# Patient Record
Sex: MALE | Race: WHITE | NOT HISPANIC OR LATINO | Employment: OTHER | URBAN - METROPOLITAN AREA
[De-identification: names, ages, dates, MRNs, and addresses within clinical notes are randomized per-mention and may not be internally consistent; named-entity substitution may affect disease eponyms.]

---

## 2022-04-28 ENCOUNTER — HOSPITAL ENCOUNTER (OUTPATIENT)
Facility: HOSPITAL | Age: 82
Setting detail: OBSERVATION
Discharge: NURSING, REHAB OR LONG-TERM CARE | End: 2022-05-04
Attending: EMERGENCY MEDICINE | Admitting: HOSPITALIST

## 2022-04-28 ENCOUNTER — APPOINTMENT (OUTPATIENT)
Dept: GENERAL RADIOLOGY | Facility: HOSPITAL | Age: 82
End: 2022-04-28

## 2022-04-28 DIAGNOSIS — R77.8 ELEVATED TROPONIN: ICD-10-CM

## 2022-04-28 DIAGNOSIS — R07.89 CHEST PAIN, ATYPICAL: ICD-10-CM

## 2022-04-28 DIAGNOSIS — I49.3 FREQUENT PVCS: ICD-10-CM

## 2022-04-28 DIAGNOSIS — E83.42 HYPOMAGNESEMIA: ICD-10-CM

## 2022-04-28 DIAGNOSIS — I49.3 PVC (PREMATURE VENTRICULAR CONTRACTION): ICD-10-CM

## 2022-04-28 DIAGNOSIS — R55 NEAR SYNCOPE: Primary | ICD-10-CM

## 2022-04-28 PROBLEM — E78.5 HYPERLIPIDEMIA: Status: ACTIVE | Noted: 2021-01-25

## 2022-04-28 PROBLEM — IMO0002 STENOSIS OF URINARY MEATUS: Status: ACTIVE | Noted: 2019-02-14

## 2022-04-28 PROBLEM — E66.3 OVERWEIGHT WITH BODY MASS INDEX (BMI) 25.0-29.9: Status: ACTIVE | Noted: 2018-02-19

## 2022-04-28 LAB
ALBUMIN SERPL-MCNC: 3.6 G/DL (ref 3.5–5.2)
ALBUMIN/GLOB SERPL: 1.6 G/DL
ALP SERPL-CCNC: 39 U/L (ref 39–117)
ALT SERPL W P-5'-P-CCNC: 22 U/L (ref 1–41)
ANION GAP SERPL CALCULATED.3IONS-SCNC: 12.1 MMOL/L (ref 5–15)
AST SERPL-CCNC: 17 U/L (ref 1–40)
BASOPHILS # BLD AUTO: 0.04 10*3/MM3 (ref 0–0.2)
BASOPHILS NFR BLD AUTO: 0.4 % (ref 0–1.5)
BILIRUB SERPL-MCNC: 0.3 MG/DL (ref 0–1.2)
BUN SERPL-MCNC: 21 MG/DL (ref 8–23)
BUN/CREAT SERPL: 20.4 (ref 7–25)
CALCIUM SPEC-SCNC: 8.8 MG/DL (ref 8.6–10.5)
CHLORIDE SERPL-SCNC: 109 MMOL/L (ref 98–107)
CO2 SERPL-SCNC: 20.9 MMOL/L (ref 22–29)
CREAT SERPL-MCNC: 1.03 MG/DL (ref 0.76–1.27)
DEPRECATED RDW RBC AUTO: 50.3 FL (ref 37–54)
EGFRCR SERPLBLD CKD-EPI 2021: 72.1 ML/MIN/1.73
EOSINOPHIL # BLD AUTO: 0 10*3/MM3 (ref 0–0.4)
EOSINOPHIL NFR BLD AUTO: 0 % (ref 0.3–6.2)
ERYTHROCYTE [DISTWIDTH] IN BLOOD BY AUTOMATED COUNT: 15.3 % (ref 12.3–15.4)
GLOBULIN UR ELPH-MCNC: 2.2 GM/DL
GLUCOSE BLDC GLUCOMTR-MCNC: 206 MG/DL (ref 70–130)
GLUCOSE SERPL-MCNC: 145 MG/DL (ref 65–99)
HCT VFR BLD AUTO: 33.1 % (ref 37.5–51)
HGB BLD-MCNC: 10.6 G/DL (ref 13–17.7)
IMM GRANULOCYTES # BLD AUTO: 0.41 10*3/MM3 (ref 0–0.05)
IMM GRANULOCYTES NFR BLD AUTO: 3.9 % (ref 0–0.5)
LYMPHOCYTES # BLD AUTO: 2.45 10*3/MM3 (ref 0.7–3.1)
LYMPHOCYTES NFR BLD AUTO: 23.4 % (ref 19.6–45.3)
MAGNESIUM SERPL-MCNC: 1.4 MG/DL (ref 1.6–2.4)
MCH RBC QN AUTO: 28.5 PG (ref 26.6–33)
MCHC RBC AUTO-ENTMCNC: 32 G/DL (ref 31.5–35.7)
MCV RBC AUTO: 89 FL (ref 79–97)
MONOCYTES # BLD AUTO: 0.82 10*3/MM3 (ref 0.1–0.9)
MONOCYTES NFR BLD AUTO: 7.8 % (ref 5–12)
NEUTROPHILS NFR BLD AUTO: 6.74 10*3/MM3 (ref 1.7–7)
NEUTROPHILS NFR BLD AUTO: 64.5 % (ref 42.7–76)
NRBC BLD AUTO-RTO: 0 /100 WBC (ref 0–0.2)
NT-PROBNP SERPL-MCNC: 1468 PG/ML (ref 0–1800)
PLATELET # BLD AUTO: 156 10*3/MM3 (ref 140–450)
PMV BLD AUTO: 9.7 FL (ref 6–12)
POTASSIUM SERPL-SCNC: 4.2 MMOL/L (ref 3.5–5.2)
PROT SERPL-MCNC: 5.8 G/DL (ref 6–8.5)
QT INTERVAL: 402 MS
QT INTERVAL: 419 MS
RBC # BLD AUTO: 3.72 10*6/MM3 (ref 4.14–5.8)
SARS-COV-2 ORF1AB RESP QL NAA+PROBE: NOT DETECTED
SODIUM SERPL-SCNC: 142 MMOL/L (ref 136–145)
TROPONIN T SERPL-MCNC: 0.03 NG/ML (ref 0–0.03)
TROPONIN T SERPL-MCNC: 0.03 NG/ML (ref 0–0.03)
TSH SERPL DL<=0.05 MIU/L-ACNC: 2.22 UIU/ML (ref 0.27–4.2)
WBC NRBC COR # BLD: 10.46 10*3/MM3 (ref 3.4–10.8)

## 2022-04-28 PROCEDURE — 71045 X-RAY EXAM CHEST 1 VIEW: CPT

## 2022-04-28 PROCEDURE — G0378 HOSPITAL OBSERVATION PER HR: HCPCS

## 2022-04-28 PROCEDURE — 83880 ASSAY OF NATRIURETIC PEPTIDE: CPT | Performed by: EMERGENCY MEDICINE

## 2022-04-28 PROCEDURE — C9803 HOPD COVID-19 SPEC COLLECT: HCPCS

## 2022-04-28 PROCEDURE — 85025 COMPLETE CBC W/AUTO DIFF WBC: CPT | Performed by: EMERGENCY MEDICINE

## 2022-04-28 PROCEDURE — 83735 ASSAY OF MAGNESIUM: CPT | Performed by: EMERGENCY MEDICINE

## 2022-04-28 PROCEDURE — 71046 X-RAY EXAM CHEST 2 VIEWS: CPT

## 2022-04-28 PROCEDURE — 93005 ELECTROCARDIOGRAM TRACING: CPT | Performed by: INTERNAL MEDICINE

## 2022-04-28 PROCEDURE — U0004 COV-19 TEST NON-CDC HGH THRU: HCPCS | Performed by: EMERGENCY MEDICINE

## 2022-04-28 PROCEDURE — 93005 ELECTROCARDIOGRAM TRACING: CPT

## 2022-04-28 PROCEDURE — 84484 ASSAY OF TROPONIN QUANT: CPT | Performed by: INTERNAL MEDICINE

## 2022-04-28 PROCEDURE — 80053 COMPREHEN METABOLIC PANEL: CPT | Performed by: EMERGENCY MEDICINE

## 2022-04-28 PROCEDURE — 99204 OFFICE O/P NEW MOD 45 MIN: CPT | Performed by: INTERNAL MEDICINE

## 2022-04-28 PROCEDURE — 0 MAGNESIUM SULFATE 4 GM/100ML SOLUTION: Performed by: INTERNAL MEDICINE

## 2022-04-28 PROCEDURE — 25010000002 MAGNESIUM SULFATE 2 GM/50ML SOLUTION: Performed by: EMERGENCY MEDICINE

## 2022-04-28 PROCEDURE — 93010 ELECTROCARDIOGRAM REPORT: CPT | Performed by: INTERNAL MEDICINE

## 2022-04-28 PROCEDURE — 82962 GLUCOSE BLOOD TEST: CPT

## 2022-04-28 PROCEDURE — 84484 ASSAY OF TROPONIN QUANT: CPT | Performed by: EMERGENCY MEDICINE

## 2022-04-28 PROCEDURE — 99285 EMERGENCY DEPT VISIT HI MDM: CPT

## 2022-04-28 PROCEDURE — 84443 ASSAY THYROID STIM HORMONE: CPT | Performed by: INTERNAL MEDICINE

## 2022-04-28 RX ORDER — DOCUSATE SODIUM 100 MG/1
100 CAPSULE, LIQUID FILLED ORAL 2 TIMES DAILY PRN
COMMUNITY
End: 2022-05-25 | Stop reason: ALTCHOICE

## 2022-04-28 RX ORDER — UREA 10 %
1 LOTION (ML) TOPICAL NIGHTLY PRN
Status: DISCONTINUED | OUTPATIENT
Start: 2022-04-28 | End: 2022-05-04 | Stop reason: HOSPADM

## 2022-04-28 RX ORDER — AMOXICILLIN 250 MG
2 CAPSULE ORAL 2 TIMES DAILY
Status: DISCONTINUED | OUTPATIENT
Start: 2022-04-28 | End: 2022-05-04 | Stop reason: HOSPADM

## 2022-04-28 RX ORDER — MAGNESIUM SULFATE HEPTAHYDRATE 40 MG/ML
2 INJECTION, SOLUTION INTRAVENOUS AS NEEDED
Status: DISCONTINUED | OUTPATIENT
Start: 2022-04-28 | End: 2022-05-02

## 2022-04-28 RX ORDER — ECHINACEA PURPUREA EXTRACT 125 MG
1 TABLET ORAL AS NEEDED
Status: DISCONTINUED | OUTPATIENT
Start: 2022-04-28 | End: 2022-05-04 | Stop reason: HOSPADM

## 2022-04-28 RX ORDER — MECLIZINE HYDROCHLORIDE 25 MG/1
25 TABLET ORAL 4 TIMES DAILY PRN
COMMUNITY

## 2022-04-28 RX ORDER — ONDANSETRON 2 MG/ML
4 INJECTION INTRAMUSCULAR; INTRAVENOUS EVERY 6 HOURS PRN
Status: DISCONTINUED | OUTPATIENT
Start: 2022-04-28 | End: 2022-05-04 | Stop reason: HOSPADM

## 2022-04-28 RX ORDER — ASPIRIN 81 MG/1
81 TABLET, CHEWABLE ORAL DAILY
COMMUNITY

## 2022-04-28 RX ORDER — ECHINACEA PURPUREA EXTRACT 125 MG
1 TABLET ORAL AS NEEDED
COMMUNITY
End: 2022-05-25 | Stop reason: ALTCHOICE

## 2022-04-28 RX ORDER — SIMVASTATIN 40 MG
40 TABLET ORAL DAILY
COMMUNITY

## 2022-04-28 RX ORDER — ACETAMINOPHEN 325 MG/1
650 TABLET ORAL EVERY 4 HOURS PRN
Status: DISCONTINUED | OUTPATIENT
Start: 2022-04-28 | End: 2022-05-04 | Stop reason: HOSPADM

## 2022-04-28 RX ORDER — BISACODYL 5 MG/1
5 TABLET, DELAYED RELEASE ORAL DAILY PRN
Status: DISCONTINUED | OUTPATIENT
Start: 2022-04-28 | End: 2022-05-04 | Stop reason: HOSPADM

## 2022-04-28 RX ORDER — SODIUM CHLORIDE 0.9 % (FLUSH) 0.9 %
10 SYRINGE (ML) INJECTION AS NEEDED
Status: DISCONTINUED | OUTPATIENT
Start: 2022-04-28 | End: 2022-05-04 | Stop reason: HOSPADM

## 2022-04-28 RX ORDER — IBUPROFEN 200 MG
1 CAPSULE ORAL DAILY
COMMUNITY
End: 2022-05-25 | Stop reason: ALTCHOICE

## 2022-04-28 RX ORDER — MIDODRINE HYDROCHLORIDE 5 MG/1
5 TABLET ORAL
Status: DISCONTINUED | OUTPATIENT
Start: 2022-04-28 | End: 2022-04-30

## 2022-04-28 RX ORDER — ALBUTEROL SULFATE 2.5 MG/.5ML
2.5 SOLUTION RESPIRATORY (INHALATION) EVERY 6 HOURS PRN
Status: DISCONTINUED | OUTPATIENT
Start: 2022-04-28 | End: 2022-05-04 | Stop reason: HOSPADM

## 2022-04-28 RX ORDER — ACETAMINOPHEN 650 MG/1
650 SUPPOSITORY RECTAL EVERY 4 HOURS PRN
Status: DISCONTINUED | OUTPATIENT
Start: 2022-04-28 | End: 2022-05-02

## 2022-04-28 RX ORDER — SODIUM CHLORIDE 0.9 % (FLUSH) 0.9 %
10 SYRINGE (ML) INJECTION EVERY 12 HOURS SCHEDULED
Status: DISCONTINUED | OUTPATIENT
Start: 2022-04-28 | End: 2022-05-04 | Stop reason: HOSPADM

## 2022-04-28 RX ORDER — ASPIRIN 81 MG/1
81 TABLET, CHEWABLE ORAL DAILY
Status: DISCONTINUED | OUTPATIENT
Start: 2022-04-28 | End: 2022-05-04 | Stop reason: HOSPADM

## 2022-04-28 RX ORDER — TAMSULOSIN HYDROCHLORIDE 0.4 MG/1
0.4 CAPSULE ORAL NIGHTLY
Status: DISCONTINUED | OUTPATIENT
Start: 2022-04-28 | End: 2022-05-04 | Stop reason: HOSPADM

## 2022-04-28 RX ORDER — SODIUM PHOSPHATE, DIBASIC AND SODIUM PHOSPHATE, MONOBASIC 7; 19 G/133ML; G/133ML
1 ENEMA RECTAL ONCE AS NEEDED
COMMUNITY
End: 2022-05-25 | Stop reason: ALTCHOICE

## 2022-04-28 RX ORDER — POLYETHYLENE GLYCOL 3350 17 G/17G
17 POWDER, FOR SOLUTION ORAL DAILY PRN
Status: DISCONTINUED | OUTPATIENT
Start: 2022-04-28 | End: 2022-05-04 | Stop reason: HOSPADM

## 2022-04-28 RX ORDER — LISINOPRIL 2.5 MG/1
2.5 TABLET ORAL DAILY
Status: DISCONTINUED | OUTPATIENT
Start: 2022-04-28 | End: 2022-04-29

## 2022-04-28 RX ORDER — DEXTROSE MONOHYDRATE 25 G/50ML
25 INJECTION, SOLUTION INTRAVENOUS
Status: DISCONTINUED | OUTPATIENT
Start: 2022-04-28 | End: 2022-05-04 | Stop reason: HOSPADM

## 2022-04-28 RX ORDER — BISACODYL 10 MG
10 SUPPOSITORY, RECTAL RECTAL AS NEEDED
COMMUNITY
End: 2022-05-25 | Stop reason: ALTCHOICE

## 2022-04-28 RX ORDER — MAGNESIUM SULFATE HEPTAHYDRATE 40 MG/ML
4 INJECTION, SOLUTION INTRAVENOUS AS NEEDED
Status: DISCONTINUED | OUTPATIENT
Start: 2022-04-28 | End: 2022-05-02

## 2022-04-28 RX ORDER — NITROGLYCERIN 0.4 MG/1
0.4 TABLET SUBLINGUAL
Status: DISCONTINUED | OUTPATIENT
Start: 2022-04-28 | End: 2022-05-04 | Stop reason: HOSPADM

## 2022-04-28 RX ORDER — MEGESTROL ACETATE 40 MG/ML
400 SUSPENSION ORAL 2 TIMES DAILY
Status: DISCONTINUED | OUTPATIENT
Start: 2022-04-28 | End: 2022-05-04 | Stop reason: HOSPADM

## 2022-04-28 RX ORDER — TAMSULOSIN HYDROCHLORIDE 0.4 MG/1
1 CAPSULE ORAL DAILY
COMMUNITY

## 2022-04-28 RX ORDER — MEGESTROL ACETATE 40 MG/ML
400 SUSPENSION ORAL 2 TIMES DAILY
COMMUNITY

## 2022-04-28 RX ORDER — ONDANSETRON 4 MG/1
4 TABLET, FILM COATED ORAL EVERY 6 HOURS PRN
Status: DISCONTINUED | OUTPATIENT
Start: 2022-04-28 | End: 2022-05-04 | Stop reason: HOSPADM

## 2022-04-28 RX ORDER — BISACODYL 10 MG
10 SUPPOSITORY, RECTAL RECTAL DAILY PRN
Status: DISCONTINUED | OUTPATIENT
Start: 2022-04-28 | End: 2022-05-04 | Stop reason: HOSPADM

## 2022-04-28 RX ORDER — ALBUTEROL SULFATE 2.5 MG/.5ML
2.5 SOLUTION RESPIRATORY (INHALATION) EVERY 6 HOURS PRN
COMMUNITY

## 2022-04-28 RX ORDER — ACETAMINOPHEN 325 MG/1
650 TABLET ORAL EVERY 6 HOURS PRN
COMMUNITY

## 2022-04-28 RX ORDER — ATORVASTATIN CALCIUM 20 MG/1
20 TABLET, FILM COATED ORAL DAILY
Status: DISCONTINUED | OUTPATIENT
Start: 2022-04-28 | End: 2022-05-04 | Stop reason: HOSPADM

## 2022-04-28 RX ORDER — ACETAMINOPHEN 160 MG/5ML
650 SOLUTION ORAL EVERY 4 HOURS PRN
Status: DISCONTINUED | OUTPATIENT
Start: 2022-04-28 | End: 2022-05-02

## 2022-04-28 RX ORDER — CALCIUM CARBONATE 200(500)MG
2 TABLET,CHEWABLE ORAL 2 TIMES DAILY PRN
Status: DISCONTINUED | OUTPATIENT
Start: 2022-04-28 | End: 2022-05-02

## 2022-04-28 RX ORDER — INSULIN LISPRO 100 [IU]/ML
0-9 INJECTION, SOLUTION INTRAVENOUS; SUBCUTANEOUS
Status: DISCONTINUED | OUTPATIENT
Start: 2022-04-28 | End: 2022-05-04 | Stop reason: HOSPADM

## 2022-04-28 RX ORDER — IBUPROFEN 200 MG
1 CAPSULE ORAL DAILY
Status: DISCONTINUED | OUTPATIENT
Start: 2022-04-28 | End: 2022-05-04 | Stop reason: HOSPADM

## 2022-04-28 RX ORDER — INSULIN LISPRO 100 [IU]/ML
INJECTION, SOLUTION INTRAVENOUS; SUBCUTANEOUS
COMMUNITY

## 2022-04-28 RX ORDER — MIDODRINE HYDROCHLORIDE 5 MG/1
5 TABLET ORAL
COMMUNITY
End: 2022-05-04 | Stop reason: HOSPADM

## 2022-04-28 RX ORDER — NICOTINE POLACRILEX 4 MG
15 LOZENGE BUCCAL
Status: DISCONTINUED | OUTPATIENT
Start: 2022-04-28 | End: 2022-05-04 | Stop reason: HOSPADM

## 2022-04-28 RX ORDER — PANTOPRAZOLE SODIUM 40 MG/1
40 TABLET, DELAYED RELEASE ORAL DAILY
Status: DISCONTINUED | OUTPATIENT
Start: 2022-04-28 | End: 2022-05-04 | Stop reason: HOSPADM

## 2022-04-28 RX ORDER — DOCUSATE SODIUM 100 MG/1
100 CAPSULE, LIQUID FILLED ORAL 2 TIMES DAILY PRN
Status: DISCONTINUED | OUTPATIENT
Start: 2022-04-28 | End: 2022-05-04 | Stop reason: HOSPADM

## 2022-04-28 RX ORDER — PANTOPRAZOLE SODIUM 40 MG/1
40 TABLET, DELAYED RELEASE ORAL DAILY
COMMUNITY
End: 2022-05-25 | Stop reason: ALTCHOICE

## 2022-04-28 RX ADMIN — ASPIRIN 81 MG: 81 TABLET, CHEWABLE ORAL at 22:07

## 2022-04-28 RX ADMIN — ATORVASTATIN CALCIUM 20 MG: 20 TABLET, FILM COATED ORAL at 22:07

## 2022-04-28 RX ADMIN — LISINOPRIL 2.5 MG: 2.5 TABLET ORAL at 22:06

## 2022-04-28 RX ADMIN — TAMSULOSIN HYDROCHLORIDE 0.4 MG: 0.4 CAPSULE ORAL at 22:07

## 2022-04-28 RX ADMIN — Medication 10 ML: at 22:08

## 2022-04-28 RX ADMIN — MEGESTROL ACETATE 400 MG: 40 SUSPENSION ORAL at 22:07

## 2022-04-28 RX ADMIN — Medication 100 MG: at 22:07

## 2022-04-28 RX ADMIN — PANTOPRAZOLE SODIUM 40 MG: 40 TABLET, DELAYED RELEASE ORAL at 22:05

## 2022-04-28 RX ADMIN — Medication 1 APPLICATION: at 22:05

## 2022-04-28 RX ADMIN — MAGNESIUM SULFATE HEPTAHYDRATE 2 G: 2 INJECTION, SOLUTION INTRAVENOUS at 15:17

## 2022-04-28 RX ADMIN — MAGNESIUM SULFATE 4 G: 4 INJECTION INTRAVENOUS at 22:22

## 2022-04-29 LAB
ANION GAP SERPL CALCULATED.3IONS-SCNC: 11.3 MMOL/L (ref 5–15)
BASOPHILS # BLD AUTO: 0.02 10*3/MM3 (ref 0–0.2)
BASOPHILS NFR BLD AUTO: 0.2 % (ref 0–1.5)
BUN SERPL-MCNC: 17 MG/DL (ref 8–23)
BUN/CREAT SERPL: 18.9 (ref 7–25)
CALCIUM SPEC-SCNC: 8.5 MG/DL (ref 8.6–10.5)
CHLORIDE SERPL-SCNC: 111 MMOL/L (ref 98–107)
CO2 SERPL-SCNC: 18.7 MMOL/L (ref 22–29)
CREAT SERPL-MCNC: 0.9 MG/DL (ref 0.76–1.27)
DEPRECATED RDW RBC AUTO: 49.1 FL (ref 37–54)
EGFRCR SERPLBLD CKD-EPI 2021: 84.7 ML/MIN/1.73
EOSINOPHIL # BLD AUTO: 0 10*3/MM3 (ref 0–0.4)
EOSINOPHIL NFR BLD AUTO: 0 % (ref 0.3–6.2)
ERYTHROCYTE [DISTWIDTH] IN BLOOD BY AUTOMATED COUNT: 15.4 % (ref 12.3–15.4)
GLUCOSE BLDC GLUCOMTR-MCNC: 172 MG/DL (ref 70–130)
GLUCOSE BLDC GLUCOMTR-MCNC: 195 MG/DL (ref 70–130)
GLUCOSE BLDC GLUCOMTR-MCNC: 223 MG/DL (ref 70–130)
GLUCOSE BLDC GLUCOMTR-MCNC: 251 MG/DL (ref 70–130)
GLUCOSE SERPL-MCNC: 169 MG/DL (ref 65–99)
HCT VFR BLD AUTO: 31.8 % (ref 37.5–51)
HGB BLD-MCNC: 10.4 G/DL (ref 13–17.7)
IMM GRANULOCYTES # BLD AUTO: 0.31 10*3/MM3 (ref 0–0.05)
IMM GRANULOCYTES NFR BLD AUTO: 3.8 % (ref 0–0.5)
LYMPHOCYTES # BLD AUTO: 2.14 10*3/MM3 (ref 0.7–3.1)
LYMPHOCYTES NFR BLD AUTO: 25.9 % (ref 19.6–45.3)
MAGNESIUM SERPL-MCNC: 2.8 MG/DL (ref 1.6–2.4)
MCH RBC QN AUTO: 28.8 PG (ref 26.6–33)
MCHC RBC AUTO-ENTMCNC: 32.7 G/DL (ref 31.5–35.7)
MCV RBC AUTO: 88.1 FL (ref 79–97)
MONOCYTES # BLD AUTO: 0.7 10*3/MM3 (ref 0.1–0.9)
MONOCYTES NFR BLD AUTO: 8.5 % (ref 5–12)
NEUTROPHILS NFR BLD AUTO: 5.08 10*3/MM3 (ref 1.7–7)
NEUTROPHILS NFR BLD AUTO: 61.6 % (ref 42.7–76)
NRBC BLD AUTO-RTO: 0 /100 WBC (ref 0–0.2)
PHOSPHATE SERPL-MCNC: 3.6 MG/DL (ref 2.5–4.5)
PLATELET # BLD AUTO: 146 10*3/MM3 (ref 140–450)
PMV BLD AUTO: 9.4 FL (ref 6–12)
POTASSIUM SERPL-SCNC: 4.4 MMOL/L (ref 3.5–5.2)
QT INTERVAL: 439 MS
QT INTERVAL: 457 MS
RBC # BLD AUTO: 3.61 10*6/MM3 (ref 4.14–5.8)
SODIUM SERPL-SCNC: 141 MMOL/L (ref 136–145)
TROPONIN T SERPL-MCNC: 0.02 NG/ML (ref 0–0.03)
WBC NRBC COR # BLD: 8.25 10*3/MM3 (ref 3.4–10.8)

## 2022-04-29 PROCEDURE — 82962 GLUCOSE BLOOD TEST: CPT

## 2022-04-29 PROCEDURE — 93005 ELECTROCARDIOGRAM TRACING: CPT | Performed by: INTERNAL MEDICINE

## 2022-04-29 PROCEDURE — 80048 BASIC METABOLIC PNL TOTAL CA: CPT | Performed by: INTERNAL MEDICINE

## 2022-04-29 PROCEDURE — 93010 ELECTROCARDIOGRAM REPORT: CPT | Performed by: INTERNAL MEDICINE

## 2022-04-29 PROCEDURE — 97530 THERAPEUTIC ACTIVITIES: CPT

## 2022-04-29 PROCEDURE — 92523 SPEECH SOUND LANG COMPREHEN: CPT | Performed by: SPEECH-LANGUAGE PATHOLOGIST

## 2022-04-29 PROCEDURE — 94667 MNPJ CHEST WALL 1ST: CPT

## 2022-04-29 PROCEDURE — 96361 HYDRATE IV INFUSION ADD-ON: CPT

## 2022-04-29 PROCEDURE — G0378 HOSPITAL OBSERVATION PER HR: HCPCS

## 2022-04-29 PROCEDURE — 94761 N-INVAS EAR/PLS OXIMETRY MLT: CPT

## 2022-04-29 PROCEDURE — 97162 PT EVAL MOD COMPLEX 30 MIN: CPT

## 2022-04-29 PROCEDURE — 97166 OT EVAL MOD COMPLEX 45 MIN: CPT

## 2022-04-29 PROCEDURE — 99214 OFFICE O/P EST MOD 30 MIN: CPT | Performed by: NURSE PRACTITIONER

## 2022-04-29 PROCEDURE — 96360 HYDRATION IV INFUSION INIT: CPT

## 2022-04-29 PROCEDURE — 84484 ASSAY OF TROPONIN QUANT: CPT | Performed by: INTERNAL MEDICINE

## 2022-04-29 PROCEDURE — 94799 UNLISTED PULMONARY SVC/PX: CPT

## 2022-04-29 PROCEDURE — 84100 ASSAY OF PHOSPHORUS: CPT | Performed by: INTERNAL MEDICINE

## 2022-04-29 PROCEDURE — 94669 MECHANICAL CHEST WALL OSCILL: CPT

## 2022-04-29 PROCEDURE — 63710000001 INSULIN LISPRO (HUMAN) PER 5 UNITS: Performed by: INTERNAL MEDICINE

## 2022-04-29 PROCEDURE — 83735 ASSAY OF MAGNESIUM: CPT | Performed by: INTERNAL MEDICINE

## 2022-04-29 PROCEDURE — 85025 COMPLETE CBC W/AUTO DIFF WBC: CPT | Performed by: INTERNAL MEDICINE

## 2022-04-29 RX ORDER — IPRATROPIUM BROMIDE AND ALBUTEROL SULFATE 2.5; .5 MG/3ML; MG/3ML
3 SOLUTION RESPIRATORY (INHALATION)
Status: DISCONTINUED | OUTPATIENT
Start: 2022-04-29 | End: 2022-05-04 | Stop reason: HOSPADM

## 2022-04-29 RX ADMIN — LISINOPRIL 2.5 MG: 2.5 TABLET ORAL at 08:55

## 2022-04-29 RX ADMIN — Medication 1 APPLICATION: at 08:58

## 2022-04-29 RX ADMIN — DOCUSATE SODIUM 50MG AND SENNOSIDES 8.6MG 2 TABLET: 8.6; 5 TABLET, FILM COATED ORAL at 08:55

## 2022-04-29 RX ADMIN — MEGESTROL ACETATE 400 MG: 40 SUSPENSION ORAL at 08:55

## 2022-04-29 RX ADMIN — IPRATROPIUM BROMIDE AND ALBUTEROL SULFATE 3 ML: .5; 3 SOLUTION RESPIRATORY (INHALATION) at 11:20

## 2022-04-29 RX ADMIN — MIDODRINE HYDROCHLORIDE 5 MG: 5 TABLET ORAL at 10:56

## 2022-04-29 RX ADMIN — IPRATROPIUM BROMIDE AND ALBUTEROL SULFATE 3 ML: .5; 3 SOLUTION RESPIRATORY (INHALATION) at 21:47

## 2022-04-29 RX ADMIN — IPRATROPIUM BROMIDE AND ALBUTEROL SULFATE 3 ML: .5; 3 SOLUTION RESPIRATORY (INHALATION) at 08:19

## 2022-04-29 RX ADMIN — PANTOPRAZOLE SODIUM 40 MG: 40 TABLET, DELAYED RELEASE ORAL at 08:58

## 2022-04-29 RX ADMIN — INSULIN LISPRO 6 UNITS: 100 INJECTION, SOLUTION INTRAVENOUS; SUBCUTANEOUS at 10:57

## 2022-04-29 RX ADMIN — INSULIN LISPRO 2 UNITS: 100 INJECTION, SOLUTION INTRAVENOUS; SUBCUTANEOUS at 08:55

## 2022-04-29 RX ADMIN — MIDODRINE HYDROCHLORIDE 5 MG: 5 TABLET ORAL at 17:43

## 2022-04-29 RX ADMIN — ATORVASTATIN CALCIUM 20 MG: 20 TABLET, FILM COATED ORAL at 08:55

## 2022-04-29 RX ADMIN — TAMSULOSIN HYDROCHLORIDE 0.4 MG: 0.4 CAPSULE ORAL at 20:35

## 2022-04-29 RX ADMIN — Medication 10 ML: at 20:35

## 2022-04-29 RX ADMIN — Medication 10 ML: at 08:56

## 2022-04-29 RX ADMIN — MIDODRINE HYDROCHLORIDE 5 MG: 5 TABLET ORAL at 06:52

## 2022-04-29 RX ADMIN — SODIUM CHLORIDE 500 ML: 9 INJECTION, SOLUTION INTRAVENOUS at 14:47

## 2022-04-29 RX ADMIN — IPRATROPIUM BROMIDE AND ALBUTEROL SULFATE 3 ML: .5; 3 SOLUTION RESPIRATORY (INHALATION) at 15:49

## 2022-04-29 RX ADMIN — ASPIRIN 81 MG: 81 TABLET, CHEWABLE ORAL at 08:55

## 2022-04-29 RX ADMIN — MEGESTROL ACETATE 400 MG: 40 SUSPENSION ORAL at 20:35

## 2022-04-29 RX ADMIN — Medication 100 MG: at 08:55

## 2022-04-30 ENCOUNTER — APPOINTMENT (OUTPATIENT)
Dept: CARDIOLOGY | Facility: HOSPITAL | Age: 82
End: 2022-04-30

## 2022-04-30 LAB
ANION GAP SERPL CALCULATED.3IONS-SCNC: 13.5 MMOL/L (ref 5–15)
AORTIC DIMENSIONLESS INDEX: 1 (DI)
BASOPHILS # BLD AUTO: 0.02 10*3/MM3 (ref 0–0.2)
BASOPHILS NFR BLD AUTO: 0.2 % (ref 0–1.5)
BH CV ECHO MEAS - AO MAX PG: 6 MMHG
BH CV ECHO MEAS - AO MEAN PG: 2.9 MMHG
BH CV ECHO MEAS - AO V2 MAX: 122.2 CM/SEC
BH CV ECHO MEAS - AO V2 VTI: 18.7 CM
BH CV ECHO MEAS - AVA(I,D): 4.5 CM2
BH CV ECHO MEAS - EDV(CUBED): 89.5 ML
BH CV ECHO MEAS - EDV(MOD-SP2): 85 ML
BH CV ECHO MEAS - EDV(MOD-SP4): 120 ML
BH CV ECHO MEAS - EF(MOD-BP): 50.1 %
BH CV ECHO MEAS - EF(MOD-SP2): 47.1 %
BH CV ECHO MEAS - EF(MOD-SP4): 48.3 %
BH CV ECHO MEAS - ESV(CUBED): 31.5 ML
BH CV ECHO MEAS - ESV(MOD-SP2): 45 ML
BH CV ECHO MEAS - ESV(MOD-SP4): 62 ML
BH CV ECHO MEAS - FS: 29.4 %
BH CV ECHO MEAS - IVS/LVPW: 1.16 CM
BH CV ECHO MEAS - IVSD: 1.13 CM
BH CV ECHO MEAS - LAT PEAK E' VEL: 7.1 CM/SEC
BH CV ECHO MEAS - LV DIASTOLIC VOL/BSA (35-75): 57.2 CM2
BH CV ECHO MEAS - LV MASS(C)D: 162.5 GRAMS
BH CV ECHO MEAS - LV MAX PG: 3.3 MMHG
BH CV ECHO MEAS - LV MEAN PG: 1.75 MMHG
BH CV ECHO MEAS - LV SYSTOLIC VOL/BSA (12-30): 29.6 CM2
BH CV ECHO MEAS - LV V1 MAX: 91.3 CM/SEC
BH CV ECHO MEAS - LV V1 VTI: 18.2 CM
BH CV ECHO MEAS - LVIDD: 4.5 CM
BH CV ECHO MEAS - LVIDS: 3.2 CM
BH CV ECHO MEAS - LVOT AREA: 4.6 CM2
BH CV ECHO MEAS - LVOT DIAM: 2.41 CM
BH CV ECHO MEAS - LVPWD: 0.97 CM
BH CV ECHO MEAS - MED PEAK E' VEL: 6 CM/SEC
BH CV ECHO MEAS - MV A DUR: 0.15 SEC
BH CV ECHO MEAS - MV A MAX VEL: 102.8 CM/SEC
BH CV ECHO MEAS - MV DEC SLOPE: 468.8 CM/SEC2
BH CV ECHO MEAS - MV DEC TIME: 0.12 MSEC
BH CV ECHO MEAS - MV E MAX VEL: 62.4 CM/SEC
BH CV ECHO MEAS - MV E/A: 0.61
BH CV ECHO MEAS - MV MAX PG: 4.6 MMHG
BH CV ECHO MEAS - MV MEAN PG: 1.52 MMHG
BH CV ECHO MEAS - MV P1/2T: 36.9 MSEC
BH CV ECHO MEAS - MV V2 VTI: 20.4 CM
BH CV ECHO MEAS - MVA(P1/2T): 6 CM2
BH CV ECHO MEAS - MVA(VTI): 4.1 CM2
BH CV ECHO MEAS - PA ACC TIME: 0.09 SEC
BH CV ECHO MEAS - PA PR(ACCEL): 37.4 MMHG
BH CV ECHO MEAS - PA V2 MAX: 118.4 CM/SEC
BH CV ECHO MEAS - PULM A REVS DUR: 0.16 SEC
BH CV ECHO MEAS - PULM A REVS VEL: 43.3 CM/SEC
BH CV ECHO MEAS - PULM DIAS VEL: 40.9 CM/SEC
BH CV ECHO MEAS - PULM S/D: 2.01
BH CV ECHO MEAS - PULM SYS VEL: 82.4 CM/SEC
BH CV ECHO MEAS - QP/QS: 0.84
BH CV ECHO MEAS - RAP SYSTOLE: 3 MMHG
BH CV ECHO MEAS - RV MAX PG: 3 MMHG
BH CV ECHO MEAS - RV V1 MAX: 87 CM/SEC
BH CV ECHO MEAS - RV V1 VTI: 14.4 CM
BH CV ECHO MEAS - RVOT DIAM: 2.48 CM
BH CV ECHO MEAS - RVSP: 42.9 MMHG
BH CV ECHO MEAS - SI(MOD-SP2): 19.1 ML/M2
BH CV ECHO MEAS - SI(MOD-SP4): 27.7 ML/M2
BH CV ECHO MEAS - SV(LVOT): 83 ML
BH CV ECHO MEAS - SV(MOD-SP2): 40 ML
BH CV ECHO MEAS - SV(MOD-SP4): 58 ML
BH CV ECHO MEAS - SV(RVOT): 69.4 ML
BH CV ECHO MEAS - TAPSE (>1.6): 2.8 CM
BH CV ECHO MEAS - TR MAX PG: 39.9 MMHG
BH CV ECHO MEAS - TR MAX VEL: 315.8 CM/SEC
BH CV ECHO MEASUREMENTS AVERAGE E/E' RATIO: 9.53
BH CV VAS BP RIGHT ARM: NORMAL MMHG
BH CV XLRA - TDI S': 17.4 CM/SEC
BUN SERPL-MCNC: 21 MG/DL (ref 8–23)
BUN/CREAT SERPL: 18.9 (ref 7–25)
CALCIUM SPEC-SCNC: 8.3 MG/DL (ref 8.6–10.5)
CHLORIDE SERPL-SCNC: 109 MMOL/L (ref 98–107)
CO2 SERPL-SCNC: 17.5 MMOL/L (ref 22–29)
CREAT SERPL-MCNC: 1.11 MG/DL (ref 0.76–1.27)
DEPRECATED RDW RBC AUTO: 50.4 FL (ref 37–54)
EGFRCR SERPLBLD CKD-EPI 2021: 65.9 ML/MIN/1.73
EOSINOPHIL # BLD AUTO: 0 10*3/MM3 (ref 0–0.4)
EOSINOPHIL NFR BLD AUTO: 0 % (ref 0.3–6.2)
ERYTHROCYTE [DISTWIDTH] IN BLOOD BY AUTOMATED COUNT: 15.6 % (ref 12.3–15.4)
GLUCOSE BLDC GLUCOMTR-MCNC: 163 MG/DL (ref 70–130)
GLUCOSE BLDC GLUCOMTR-MCNC: 197 MG/DL (ref 70–130)
GLUCOSE BLDC GLUCOMTR-MCNC: 219 MG/DL (ref 70–130)
GLUCOSE BLDC GLUCOMTR-MCNC: 244 MG/DL (ref 70–130)
GLUCOSE SERPL-MCNC: 176 MG/DL (ref 65–99)
HCT VFR BLD AUTO: 29.7 % (ref 37.5–51)
HGB BLD-MCNC: 9.5 G/DL (ref 13–17.7)
IMM GRANULOCYTES # BLD AUTO: 0.24 10*3/MM3 (ref 0–0.05)
IMM GRANULOCYTES NFR BLD AUTO: 2.8 % (ref 0–0.5)
LEFT ATRIUM VOLUME INDEX: 21.2 ML/M2
LYMPHOCYTES # BLD AUTO: 1.6 10*3/MM3 (ref 0.7–3.1)
LYMPHOCYTES NFR BLD AUTO: 18.4 % (ref 19.6–45.3)
MAXIMAL PREDICTED HEART RATE: 137 BPM
MCH RBC QN AUTO: 28.7 PG (ref 26.6–33)
MCHC RBC AUTO-ENTMCNC: 32 G/DL (ref 31.5–35.7)
MCV RBC AUTO: 89.7 FL (ref 79–97)
MONOCYTES # BLD AUTO: 0.72 10*3/MM3 (ref 0.1–0.9)
MONOCYTES NFR BLD AUTO: 8.3 % (ref 5–12)
NEUTROPHILS NFR BLD AUTO: 6.13 10*3/MM3 (ref 1.7–7)
NEUTROPHILS NFR BLD AUTO: 70.3 % (ref 42.7–76)
NRBC BLD AUTO-RTO: 0.1 /100 WBC (ref 0–0.2)
PLATELET # BLD AUTO: 141 10*3/MM3 (ref 140–450)
PMV BLD AUTO: 9.3 FL (ref 6–12)
POTASSIUM SERPL-SCNC: 4.3 MMOL/L (ref 3.5–5.2)
RBC # BLD AUTO: 3.31 10*6/MM3 (ref 4.14–5.8)
SINUS: 3.9 CM
SODIUM SERPL-SCNC: 140 MMOL/L (ref 136–145)
STRESS TARGET HR: 116 BPM
WBC NRBC COR # BLD: 8.71 10*3/MM3 (ref 3.4–10.8)

## 2022-04-30 PROCEDURE — G0378 HOSPITAL OBSERVATION PER HR: HCPCS

## 2022-04-30 PROCEDURE — 97110 THERAPEUTIC EXERCISES: CPT

## 2022-04-30 PROCEDURE — 94799 UNLISTED PULMONARY SVC/PX: CPT

## 2022-04-30 PROCEDURE — 94664 DEMO&/EVAL PT USE INHALER: CPT

## 2022-04-30 PROCEDURE — 94761 N-INVAS EAR/PLS OXIMETRY MLT: CPT

## 2022-04-30 PROCEDURE — 63710000001 INSULIN LISPRO (HUMAN) PER 5 UNITS: Performed by: INTERNAL MEDICINE

## 2022-04-30 PROCEDURE — 93306 TTE W/DOPPLER COMPLETE: CPT | Performed by: INTERNAL MEDICINE

## 2022-04-30 PROCEDURE — 99214 OFFICE O/P EST MOD 30 MIN: CPT | Performed by: NURSE PRACTITIONER

## 2022-04-30 PROCEDURE — 82962 GLUCOSE BLOOD TEST: CPT

## 2022-04-30 PROCEDURE — 94668 MNPJ CHEST WALL SBSQ: CPT

## 2022-04-30 PROCEDURE — 96361 HYDRATE IV INFUSION ADD-ON: CPT

## 2022-04-30 PROCEDURE — 85025 COMPLETE CBC W/AUTO DIFF WBC: CPT | Performed by: INTERNAL MEDICINE

## 2022-04-30 PROCEDURE — 93306 TTE W/DOPPLER COMPLETE: CPT

## 2022-04-30 PROCEDURE — 80048 BASIC METABOLIC PNL TOTAL CA: CPT | Performed by: INTERNAL MEDICINE

## 2022-04-30 PROCEDURE — 94669 MECHANICAL CHEST WALL OSCILL: CPT

## 2022-04-30 RX ORDER — SODIUM BICARBONATE 650 MG/1
650 TABLET ORAL 2 TIMES DAILY
Status: DISCONTINUED | OUTPATIENT
Start: 2022-04-30 | End: 2022-05-04 | Stop reason: HOSPADM

## 2022-04-30 RX ADMIN — Medication 100 MG: at 09:40

## 2022-04-30 RX ADMIN — MEGESTROL ACETATE 400 MG: 40 SUSPENSION ORAL at 20:20

## 2022-04-30 RX ADMIN — INSULIN LISPRO 2 UNITS: 100 INJECTION, SOLUTION INTRAVENOUS; SUBCUTANEOUS at 12:14

## 2022-04-30 RX ADMIN — Medication 10 ML: at 20:20

## 2022-04-30 RX ADMIN — INSULIN LISPRO 4 UNITS: 100 INJECTION, SOLUTION INTRAVENOUS; SUBCUTANEOUS at 16:33

## 2022-04-30 RX ADMIN — IPRATROPIUM BROMIDE AND ALBUTEROL SULFATE 3 ML: .5; 3 SOLUTION RESPIRATORY (INHALATION) at 07:08

## 2022-04-30 RX ADMIN — SODIUM BICARBONATE 650 MG: 650 TABLET ORAL at 14:20

## 2022-04-30 RX ADMIN — IPRATROPIUM BROMIDE AND ALBUTEROL SULFATE 3 ML: .5; 3 SOLUTION RESPIRATORY (INHALATION) at 11:03

## 2022-04-30 RX ADMIN — ASPIRIN 81 MG: 81 TABLET, CHEWABLE ORAL at 09:38

## 2022-04-30 RX ADMIN — PANTOPRAZOLE SODIUM 40 MG: 40 TABLET, DELAYED RELEASE ORAL at 09:39

## 2022-04-30 RX ADMIN — MIDODRINE HYDROCHLORIDE 7.5 MG: 5 TABLET ORAL at 16:33

## 2022-04-30 RX ADMIN — DOCUSATE SODIUM 50MG AND SENNOSIDES 8.6MG 2 TABLET: 8.6; 5 TABLET, FILM COATED ORAL at 20:20

## 2022-04-30 RX ADMIN — ATORVASTATIN CALCIUM 20 MG: 20 TABLET, FILM COATED ORAL at 09:38

## 2022-04-30 RX ADMIN — Medication 1 APPLICATION: at 09:39

## 2022-04-30 RX ADMIN — TAMSULOSIN HYDROCHLORIDE 0.4 MG: 0.4 CAPSULE ORAL at 20:20

## 2022-04-30 RX ADMIN — INSULIN LISPRO 2 UNITS: 100 INJECTION, SOLUTION INTRAVENOUS; SUBCUTANEOUS at 09:38

## 2022-04-30 RX ADMIN — SODIUM BICARBONATE 650 MG: 650 TABLET ORAL at 20:20

## 2022-04-30 RX ADMIN — SODIUM CHLORIDE 500 ML: 9 INJECTION, SOLUTION INTRAVENOUS at 14:20

## 2022-04-30 RX ADMIN — IPRATROPIUM BROMIDE AND ALBUTEROL SULFATE 3 ML: .5; 3 SOLUTION RESPIRATORY (INHALATION) at 15:58

## 2022-04-30 RX ADMIN — MICONAZOLE NITRATE 1 APPLICATION: 20 CREAM TOPICAL at 09:00

## 2022-04-30 RX ADMIN — MEGESTROL ACETATE 400 MG: 40 SUSPENSION ORAL at 09:38

## 2022-04-30 RX ADMIN — DOCUSATE SODIUM 50MG AND SENNOSIDES 8.6MG 2 TABLET: 8.6; 5 TABLET, FILM COATED ORAL at 09:38

## 2022-04-30 RX ADMIN — MIDODRINE HYDROCHLORIDE 5 MG: 5 TABLET ORAL at 06:38

## 2022-04-30 RX ADMIN — MIDODRINE HYDROCHLORIDE 5 MG: 5 TABLET ORAL at 12:14

## 2022-04-30 RX ADMIN — MICONAZOLE NITRATE 1 APPLICATION: 20 CREAM TOPICAL at 20:20

## 2022-04-30 RX ADMIN — IPRATROPIUM BROMIDE AND ALBUTEROL SULFATE 3 ML: .5; 3 SOLUTION RESPIRATORY (INHALATION) at 20:11

## 2022-04-30 RX ADMIN — Medication 10 ML: at 09:00

## 2022-05-01 LAB
ANION GAP SERPL CALCULATED.3IONS-SCNC: 12 MMOL/L (ref 5–15)
BASOPHILS # BLD AUTO: 0.02 10*3/MM3 (ref 0–0.2)
BASOPHILS NFR BLD AUTO: 0.2 % (ref 0–1.5)
BUN SERPL-MCNC: 19 MG/DL (ref 8–23)
BUN/CREAT SERPL: 20.4 (ref 7–25)
CALCIUM SPEC-SCNC: 8.5 MG/DL (ref 8.6–10.5)
CHLORIDE SERPL-SCNC: 108 MMOL/L (ref 98–107)
CO2 SERPL-SCNC: 19 MMOL/L (ref 22–29)
CREAT SERPL-MCNC: 0.93 MG/DL (ref 0.76–1.27)
DEPRECATED RDW RBC AUTO: 50.7 FL (ref 37–54)
EGFRCR SERPLBLD CKD-EPI 2021: 81.5 ML/MIN/1.73
EOSINOPHIL # BLD AUTO: 0 10*3/MM3 (ref 0–0.4)
EOSINOPHIL NFR BLD AUTO: 0 % (ref 0.3–6.2)
ERYTHROCYTE [DISTWIDTH] IN BLOOD BY AUTOMATED COUNT: 15.6 % (ref 12.3–15.4)
GLUCOSE BLDC GLUCOMTR-MCNC: 137 MG/DL (ref 70–130)
GLUCOSE BLDC GLUCOMTR-MCNC: 192 MG/DL (ref 70–130)
GLUCOSE BLDC GLUCOMTR-MCNC: 197 MG/DL (ref 70–130)
GLUCOSE BLDC GLUCOMTR-MCNC: 218 MG/DL (ref 70–130)
GLUCOSE SERPL-MCNC: 157 MG/DL (ref 65–99)
HCT VFR BLD AUTO: 31 % (ref 37.5–51)
HGB BLD-MCNC: 10 G/DL (ref 13–17.7)
IMM GRANULOCYTES # BLD AUTO: 0.21 10*3/MM3 (ref 0–0.05)
IMM GRANULOCYTES NFR BLD AUTO: 2.5 % (ref 0–0.5)
LYMPHOCYTES # BLD AUTO: 1.77 10*3/MM3 (ref 0.7–3.1)
LYMPHOCYTES NFR BLD AUTO: 21.4 % (ref 19.6–45.3)
MCH RBC QN AUTO: 29 PG (ref 26.6–33)
MCHC RBC AUTO-ENTMCNC: 32.3 G/DL (ref 31.5–35.7)
MCV RBC AUTO: 89.9 FL (ref 79–97)
MONOCYTES # BLD AUTO: 0.74 10*3/MM3 (ref 0.1–0.9)
MONOCYTES NFR BLD AUTO: 8.9 % (ref 5–12)
NEUTROPHILS NFR BLD AUTO: 5.55 10*3/MM3 (ref 1.7–7)
NEUTROPHILS NFR BLD AUTO: 67 % (ref 42.7–76)
NRBC BLD AUTO-RTO: 0 /100 WBC (ref 0–0.2)
PLATELET # BLD AUTO: 131 10*3/MM3 (ref 140–450)
PMV BLD AUTO: 9.2 FL (ref 6–12)
POTASSIUM SERPL-SCNC: 4.6 MMOL/L (ref 3.5–5.2)
RBC # BLD AUTO: 3.45 10*6/MM3 (ref 4.14–5.8)
SODIUM SERPL-SCNC: 139 MMOL/L (ref 136–145)
WBC NRBC COR # BLD: 8.29 10*3/MM3 (ref 3.4–10.8)

## 2022-05-01 PROCEDURE — 96361 HYDRATE IV INFUSION ADD-ON: CPT

## 2022-05-01 PROCEDURE — 82962 GLUCOSE BLOOD TEST: CPT

## 2022-05-01 PROCEDURE — 80048 BASIC METABOLIC PNL TOTAL CA: CPT | Performed by: INTERNAL MEDICINE

## 2022-05-01 PROCEDURE — 63710000001 INSULIN LISPRO (HUMAN) PER 5 UNITS: Performed by: INTERNAL MEDICINE

## 2022-05-01 PROCEDURE — G0378 HOSPITAL OBSERVATION PER HR: HCPCS

## 2022-05-01 PROCEDURE — 94799 UNLISTED PULMONARY SVC/PX: CPT

## 2022-05-01 PROCEDURE — 85025 COMPLETE CBC W/AUTO DIFF WBC: CPT | Performed by: INTERNAL MEDICINE

## 2022-05-01 PROCEDURE — 94664 DEMO&/EVAL PT USE INHALER: CPT

## 2022-05-01 PROCEDURE — 94669 MECHANICAL CHEST WALL OSCILL: CPT

## 2022-05-01 PROCEDURE — 99214 OFFICE O/P EST MOD 30 MIN: CPT | Performed by: NURSE PRACTITIONER

## 2022-05-01 PROCEDURE — 94761 N-INVAS EAR/PLS OXIMETRY MLT: CPT

## 2022-05-01 RX ORDER — MIDODRINE HYDROCHLORIDE 10 MG/1
10 TABLET ORAL
Qty: 90 TABLET | Refills: 0 | Status: SHIPPED | OUTPATIENT
Start: 2022-05-01 | End: 2022-05-25

## 2022-05-01 RX ORDER — MIDODRINE HYDROCHLORIDE 5 MG/1
10 TABLET ORAL
Status: DISCONTINUED | OUTPATIENT
Start: 2022-05-01 | End: 2022-05-04 | Stop reason: HOSPADM

## 2022-05-01 RX ADMIN — MIDODRINE HYDROCHLORIDE 10 MG: 5 TABLET ORAL at 11:57

## 2022-05-01 RX ADMIN — DOCUSATE SODIUM 50MG AND SENNOSIDES 8.6MG 2 TABLET: 8.6; 5 TABLET, FILM COATED ORAL at 08:15

## 2022-05-01 RX ADMIN — MICONAZOLE NITRATE 1 APPLICATION: 20 CREAM TOPICAL at 09:11

## 2022-05-01 RX ADMIN — MIDODRINE HYDROCHLORIDE 10 MG: 5 TABLET ORAL at 18:04

## 2022-05-01 RX ADMIN — MIDODRINE HYDROCHLORIDE 7.5 MG: 5 TABLET ORAL at 06:06

## 2022-05-01 RX ADMIN — PANTOPRAZOLE SODIUM 40 MG: 40 TABLET, DELAYED RELEASE ORAL at 08:15

## 2022-05-01 RX ADMIN — Medication 10 ML: at 23:47

## 2022-05-01 RX ADMIN — SODIUM BICARBONATE 650 MG: 650 TABLET ORAL at 08:15

## 2022-05-01 RX ADMIN — ATORVASTATIN CALCIUM 20 MG: 20 TABLET, FILM COATED ORAL at 08:15

## 2022-05-01 RX ADMIN — IPRATROPIUM BROMIDE AND ALBUTEROL SULFATE 3 ML: .5; 3 SOLUTION RESPIRATORY (INHALATION) at 07:31

## 2022-05-01 RX ADMIN — MEGESTROL ACETATE 400 MG: 40 SUSPENSION ORAL at 23:45

## 2022-05-01 RX ADMIN — IPRATROPIUM BROMIDE AND ALBUTEROL SULFATE 3 ML: .5; 3 SOLUTION RESPIRATORY (INHALATION) at 15:27

## 2022-05-01 RX ADMIN — MEGESTROL ACETATE 400 MG: 40 SUSPENSION ORAL at 08:15

## 2022-05-01 RX ADMIN — SODIUM BICARBONATE 650 MG: 650 TABLET ORAL at 23:47

## 2022-05-01 RX ADMIN — Medication 10 ML: at 09:11

## 2022-05-01 RX ADMIN — DOCUSATE SODIUM 50MG AND SENNOSIDES 8.6MG 2 TABLET: 8.6; 5 TABLET, FILM COATED ORAL at 23:45

## 2022-05-01 RX ADMIN — Medication 1 APPLICATION: at 08:15

## 2022-05-01 RX ADMIN — ASPIRIN 81 MG: 81 TABLET, CHEWABLE ORAL at 08:15

## 2022-05-01 RX ADMIN — SODIUM CHLORIDE 500 ML: 9 INJECTION, SOLUTION INTRAVENOUS at 11:58

## 2022-05-01 RX ADMIN — INSULIN LISPRO 2 UNITS: 100 INJECTION, SOLUTION INTRAVENOUS; SUBCUTANEOUS at 08:15

## 2022-05-01 RX ADMIN — IPRATROPIUM BROMIDE AND ALBUTEROL SULFATE 3 ML: .5; 3 SOLUTION RESPIRATORY (INHALATION) at 11:29

## 2022-05-01 RX ADMIN — TAMSULOSIN HYDROCHLORIDE 0.4 MG: 0.4 CAPSULE ORAL at 23:45

## 2022-05-01 RX ADMIN — Medication 100 MG: at 08:15

## 2022-05-01 NOTE — DISCHARGE SUMMARY
[address] [facility]               [facility]    Date of Discharge:  5/4/2022    PCP: Qi Ann PA    Discharge Diagnosis:   Active Hospital Problems    Diagnosis  POA   • Near syncope [R55]  Yes   • Hypomagnesemia [E83.42]  Yes   • BPH (benign prostatic hyperplasia) [N40.0]  Yes   • Pulmonary hypertension  [I27.20]  Yes   • Type 2 diabetes mellitus with hyperglycemia  [E11.65]  Yes   • History of COVID-19 jan 2022 [Z86.16]  Yes   • PVC (premature ventricular contraction) [I49.3]  Yes   • Elevated troponin [R77.8]  Yes      Resolved Hospital Problems   No resolved problems to display.          Consults     Date and Time Order Name Status Description    4/28/2022  6:25 PM Inpatient Cardiology Consult      4/28/2022  2:03 PM LCG (on-call MD unless specified) Completed         Hospital Course  81 y.o. male with history of anemia, diabetes, presented to the hospital, with lightheadedness and near syncope, cardiology evaluation was requested.  Patient does have PVCs, he also had orthostatic hypotension, patient's midodrine dose has been increased to 10 mg p.o. 3 times daily.  Cardiology has cleared the patient for discharge today.  They will follow-up with him on outpatient basis.  He also has hypomagnesemia, replacement of electrolytes was done per protocol.  Patient also has history of anemia, hemoglobin overall has remained stable.  Patient does have history of diabetes, we did continue Accu-Cheks and sliding scale insulin coverage.  I have seen and examined patient at bedside, he is deemed stable to be discharged today.  I have also ordered home health physical therapy for him.  Please note total time spent on discharge management is more than 30 minutes.        Condition on Discharge: Improved.     Temp:  [97.1 °F (36.2 °C)-98.4 °F (36.9 °C)] 97.1 °F (36.2 °C)  Heart Rate:  [73-91] 78  Resp:  [18-20] 20  BP: ()/(66-87) 118/77  Body mass index is 23.61 kg/m².    Physical Exam     General, awake and alert.  Head and ENT, normocephalic and atraumatic.  Lungs, symmetric expansion, equal air entry bilaterally.  Heart, regular rate and rhythm.  Abdomen, soft and nontender.  Extremities, no clubbing or cyanosis.  Neuro, no focal deficits.  Skin: Warm and no rash.  Psych, normal mood and affect.  Musculoskeletal, joint examination is grossly normal.    Disposition: Home-Health Care [facility]       Discharge Medications      Changes to Medications      Instructions Start Date   midodrine 10 MG tablet  Commonly known as: PROAMATINE  What changed:   · medication strength  · how much to take   10 mg, Oral, 3 Times Daily Before Meals         Continue These Medications      Instructions Start Date   acetaminophen 325 MG tablet  Commonly known as: TYLENOL   650 mg, Oral, Every 6 Hours PRN      albuterol 2.5 MG/0.5ML nebulizer solution  Commonly known as: PROVENTIL   2.5 mg, Nebulization, Every 6 Hours PRN      aspirin 81 MG chewable tablet   81 mg, Oral, Daily      Dimethicone 5 % cream   1 application, Topical, 2 Times Daily      docusate sodium 100 MG capsule  Commonly known as: COLACE   100 mg, Oral, 2 Times Daily PRN      Dulcolax 10 MG suppository  Generic drug: bisacodyl   10 mg, Rectal, As Needed      fleet enema 7-19 GM/118ML enema   1 enema, Rectal, Once As Needed      Insulin Lispro 100 UNIT/ML injection  Commonly known as: humaLOG   Subcutaneous, 4 Times Daily - RT, Per sliding scale      meclizine 25 MG tablet  Commonly known as: ANTIVERT   25 mg, Oral, 4 Times Daily PRN      megestrol acetate 400 MG/10ML suspension oral suspension  Commonly known as: MEGACE   400 mg, Oral, 2 Times Daily      metFORMIN 500 MG tablet  Commonly known as: GLUCOPHAGE   500 mg, Oral, Every 12 Hours Scheduled      miconazole 2 % cream  Commonly known as: MICOTIN   1 application, Topical, 2 Times Daily      Milk of Magnesia 400 MG/5ML suspension  Generic drug: magnesium hydroxide   30 mL, Oral, Daily PRN       neomycin-bacitracin-polymyxin b 3.5-400-5000 ointment ointment   1 application, Topical, Daily      pantoprazole 40 MG EC tablet  Commonly known as: PROTONIX   40 mg, Oral, Daily      simvastatin 40 MG tablet  Commonly known as: ZOCOR   40 mg, Oral, Daily      sodium chloride 0.65 % nasal spray   1 spray, Nasal, As Needed      tamsulosin 0.4 MG capsule 24 hr capsule  Commonly known as: FLOMAX   1 capsule, Oral, Daily      thiamine 100 MG tablet  tablet  Commonly known as: VITAMIN B-1   200 mg, Oral, 2 Times Daily              Additional Instructions for the Follow-ups that You Need to Schedule     Ambulatory Referral to Home Health   As directed      Face to Face Visit Date: 5/1/2022    Follow-up provider for Plan of Care?: I treated the patient in an acute care facility and will not continue treatment after discharge.    Follow-up provider: JAYCEE BROWN [[id number]]    Reason/Clinical Findings: weakness    Describe mobility limitations that make leaving home difficult: weakness    Nursing/Therapeutic Services Requested: Physical Therapy    Frequency: 1 Week 1         Discharge Follow-up with PCP   As directed       Currently Documented PCP:    Qi Ann PA    PCP Phone Number:    [phone]     Follow Up Details: Follow-up with PCP in 7 days.         Discharge Follow-up with Specialty: Follow-up with cardiology within 7 days.   As directed      Specialty: Follow-up with cardiology within 7 days.            Follow-up Information     Jaycee Brown APRN Follow up in 1 week(s).    Specialties: Cardiology, Nurse Practitioner  Why: 1-2 week with nuclear stress test and bring  home blood pressure log if available  Contact information:  [address]  [facility] [address] [phone]             Qi Ann PA .    Specialty: Physician Assistant  Why: Follow-up with PCP in 7 days.  Contact information:  [address] [place] [address] [address] [place] [address] [phone]                            Eddie Brennan MD  05/01/22  16:38 EDT    Discharge time spent greater than 30 minutes.      ADDENDUM  Patient's discharge was held due to needing precert from insurance company prior to discharge.  Precert has been obtained and plan is to discharge to a skilled nursing facility.  Compression stockings were ordered and placed and should be continued.    Electronically signed by Kip Kc MD, 05/04/22, 10:50 AM EDT.

## 2022-05-01 NOTE — PROGRESS NOTES
"CC: Near syncope, orthostatic hypotension    Interval History: No further dizziness.  Denies chest pain.  Performed orthostatic blood pressures in lying down he was 136/91, sitting 111/77 and standing 87/52.  He does not dizzy or lightheaded      Vital Signs  Temp:  [97.6 °F (36.4 °C)-98.4 °F (36.9 °C)] 97.8 °F (36.6 °C)  Heart Rate:  [73-95] 74  Resp:  [18-20] 20  BP: ()/(66-87) 101/68    Intake/Output Summary (Last 24 hours) at 5/1/2022 1128  Last data filed at 5/1/2022 0815  Gross per 24 hour   Intake 510 ml   Output 1900 ml   Net -1390 ml     Flowsheet Rows    Flowsheet Row First Filed Value   Admission Height 188 cm (74\") Documented at 04/28/2022 1246   Admission Weight 82.1 kg (181 lb) Documented at 04/28/2022 1246          PHYSICAL EXAM:  General: No acute distress  Resp:NL Rate, unlabored, clear  CV:NL rate and rhythm, NL PMI, Nl S1 and S2, no Murmur, no gallop, no rub, No JVD. Normal pedal pulses  ABD:Nl sounds, no masses or tenderness, nondistended, no guarding or rebound  Neuro: alert,cooperative and oriented  Extr: No edema or cyanosis, moves all extremities      Results Review:    Results from last 7 days   Lab Units 05/01/22  0747   SODIUM mmol/L 139   POTASSIUM mmol/L 4.6   CHLORIDE mmol/L 108*   CO2 mmol/L 19.0*   BUN mg/dL 19   CREATININE mg/dL 0.93   GLUCOSE mg/dL 157*   CALCIUM mg/dL 8.5*     Results from last 7 days   Lab Units 04/29/22  0517 04/28/22  2132 04/28/22  1255   TROPONIN T ng/mL 0.020 0.026 0.033*     Results from last 7 days   Lab Units 05/01/22  0747   WBC 10*3/mm3 8.29   HEMOGLOBIN g/dL 10.0*   HEMATOCRIT % 31.0*   PLATELETS 10*3/mm3 131*             Results from last 7 days   Lab Units 04/29/22  0517   MAGNESIUM mg/dL 2.8*         I reviewed the patient's new clinical results.  I personally viewed and interpreted the patient's EKG/Telemetry data-normal sinus rhythm      Medication Review:   Meds reviewed         Assessment/Plan  1. Lightheadedness and near syncope. " Orthostatics were positive again today but he was not symptomatic.  I will give another IV fluid bolus and increase midodrine to 10 mg 3 times a day  2. PVC's - normal TSH. Mag was low.  Improved with mag replacement.  3. Chronic hypoxic respiratory failure status post COVID19 infection 3 months ago  4. Diabetes mellitus type II  5. Pulmonary hypertension- appears mild on echo preliminary today  6. Minimally elevated troponin, do not suspect ACS. Did not trend up and normalized.  Could be related to hypertension.  Echocardiogram showed normal left ventricular systolic function and normal wall motion.  We will plan perfusion stress test outpatient    Discussed need to move slowly with position changes with stay hydrated with patient and family.  As above we will give IV fluid and increase midodrine.  We will plan perfusion stress test outpatient and I have arranged that he see me personally in the office in 1 to 2 weeks to follow-up.  No opposition to discharge home with home health.  Discussed plan with primary nurse, Nayla    Will see again ANDREI Silva  05/01/22  11:28 EDT

## 2022-05-01 NOTE — PLAN OF CARE
Goal Outcome Evaluation:              Outcome Evaluation: Pt is A&Ox4, orthostatic BP still positive this am, midodrine dose increased.  Pt has discharge orders, family would like him to go back to [facility], spoke with CCP regarding precert.

## 2022-05-02 PROBLEM — I95.1 ORTHOSTATIC HYPOTENSION: Status: ACTIVE | Noted: 2022-04-28

## 2022-05-02 PROBLEM — E83.42 HYPOMAGNESEMIA: Status: RESOLVED | Noted: 2022-04-28 | Resolved: 2022-05-02

## 2022-05-02 LAB
ALBUMIN SERPL-MCNC: 3.2 G/DL (ref 3.5–5.2)
ALBUMIN/GLOB SERPL: 1.3 G/DL
ALP SERPL-CCNC: 48 U/L (ref 39–117)
ALT SERPL W P-5'-P-CCNC: 24 U/L (ref 1–41)
ANION GAP SERPL CALCULATED.3IONS-SCNC: 9.3 MMOL/L (ref 5–15)
AST SERPL-CCNC: 13 U/L (ref 1–40)
BASOPHILS # BLD AUTO: 0.02 10*3/MM3 (ref 0–0.2)
BASOPHILS NFR BLD AUTO: 0.3 % (ref 0–1.5)
BILIRUB SERPL-MCNC: 0.2 MG/DL (ref 0–1.2)
BUN SERPL-MCNC: 20 MG/DL (ref 8–23)
BUN/CREAT SERPL: 20.6 (ref 7–25)
CALCIUM SPEC-SCNC: 8.4 MG/DL (ref 8.6–10.5)
CHLORIDE SERPL-SCNC: 107 MMOL/L (ref 98–107)
CO2 SERPL-SCNC: 19.7 MMOL/L (ref 22–29)
CREAT SERPL-MCNC: 0.97 MG/DL (ref 0.76–1.27)
DEPRECATED RDW RBC AUTO: 48.7 FL (ref 37–54)
EGFRCR SERPLBLD CKD-EPI 2021: 77.5 ML/MIN/1.73
EOSINOPHIL # BLD AUTO: 0 10*3/MM3 (ref 0–0.4)
EOSINOPHIL NFR BLD AUTO: 0 % (ref 0.3–6.2)
ERYTHROCYTE [DISTWIDTH] IN BLOOD BY AUTOMATED COUNT: 15.6 % (ref 12.3–15.4)
GLOBULIN UR ELPH-MCNC: 2.5 GM/DL
GLUCOSE BLDC GLUCOMTR-MCNC: 163 MG/DL (ref 70–130)
GLUCOSE BLDC GLUCOMTR-MCNC: 199 MG/DL (ref 70–130)
GLUCOSE BLDC GLUCOMTR-MCNC: 243 MG/DL (ref 70–130)
GLUCOSE SERPL-MCNC: 182 MG/DL (ref 65–99)
HCT VFR BLD AUTO: 31.3 % (ref 37.5–51)
HGB BLD-MCNC: 10.4 G/DL (ref 13–17.7)
IMM GRANULOCYTES # BLD AUTO: 0.31 10*3/MM3 (ref 0–0.05)
IMM GRANULOCYTES NFR BLD AUTO: 4 % (ref 0–0.5)
LYMPHOCYTES # BLD AUTO: 1.52 10*3/MM3 (ref 0.7–3.1)
LYMPHOCYTES NFR BLD AUTO: 19.8 % (ref 19.6–45.3)
MCH RBC QN AUTO: 28.9 PG (ref 26.6–33)
MCHC RBC AUTO-ENTMCNC: 33.2 G/DL (ref 31.5–35.7)
MCV RBC AUTO: 86.9 FL (ref 79–97)
MONOCYTES # BLD AUTO: 0.62 10*3/MM3 (ref 0.1–0.9)
MONOCYTES NFR BLD AUTO: 8.1 % (ref 5–12)
NEUTROPHILS NFR BLD AUTO: 5.22 10*3/MM3 (ref 1.7–7)
NEUTROPHILS NFR BLD AUTO: 67.8 % (ref 42.7–76)
NRBC BLD AUTO-RTO: 0 /100 WBC (ref 0–0.2)
PLATELET # BLD AUTO: 147 10*3/MM3 (ref 140–450)
PMV BLD AUTO: 9.6 FL (ref 6–12)
POTASSIUM SERPL-SCNC: 4.3 MMOL/L (ref 3.5–5.2)
PROT SERPL-MCNC: 5.7 G/DL (ref 6–8.5)
RBC # BLD AUTO: 3.6 10*6/MM3 (ref 4.14–5.8)
SODIUM SERPL-SCNC: 136 MMOL/L (ref 136–145)
WBC NRBC COR # BLD: 7.69 10*3/MM3 (ref 3.4–10.8)

## 2022-05-02 PROCEDURE — 94799 UNLISTED PULMONARY SVC/PX: CPT

## 2022-05-02 PROCEDURE — 99213 OFFICE O/P EST LOW 20 MIN: CPT | Performed by: INTERNAL MEDICINE

## 2022-05-02 PROCEDURE — 82962 GLUCOSE BLOOD TEST: CPT

## 2022-05-02 PROCEDURE — 63710000001 INSULIN LISPRO (HUMAN) PER 5 UNITS: Performed by: INTERNAL MEDICINE

## 2022-05-02 PROCEDURE — 85025 COMPLETE CBC W/AUTO DIFF WBC: CPT | Performed by: INTERNAL MEDICINE

## 2022-05-02 PROCEDURE — G0378 HOSPITAL OBSERVATION PER HR: HCPCS

## 2022-05-02 PROCEDURE — 94761 N-INVAS EAR/PLS OXIMETRY MLT: CPT

## 2022-05-02 PROCEDURE — 97530 THERAPEUTIC ACTIVITIES: CPT

## 2022-05-02 PROCEDURE — 80053 COMPREHEN METABOLIC PANEL: CPT | Performed by: INTERNAL MEDICINE

## 2022-05-02 PROCEDURE — 94664 DEMO&/EVAL PT USE INHALER: CPT

## 2022-05-02 RX ADMIN — Medication 10 ML: at 08:15

## 2022-05-02 RX ADMIN — TAMSULOSIN HYDROCHLORIDE 0.4 MG: 0.4 CAPSULE ORAL at 22:06

## 2022-05-02 RX ADMIN — IPRATROPIUM BROMIDE AND ALBUTEROL SULFATE 3 ML: .5; 3 SOLUTION RESPIRATORY (INHALATION) at 19:05

## 2022-05-02 RX ADMIN — SODIUM BICARBONATE 650 MG: 650 TABLET ORAL at 08:19

## 2022-05-02 RX ADMIN — IPRATROPIUM BROMIDE AND ALBUTEROL SULFATE 3 ML: .5; 3 SOLUTION RESPIRATORY (INHALATION) at 15:47

## 2022-05-02 RX ADMIN — MIDODRINE HYDROCHLORIDE 10 MG: 5 TABLET ORAL at 11:55

## 2022-05-02 RX ADMIN — INSULIN LISPRO 2 UNITS: 100 INJECTION, SOLUTION INTRAVENOUS; SUBCUTANEOUS at 08:19

## 2022-05-02 RX ADMIN — INSULIN LISPRO 2 UNITS: 100 INJECTION, SOLUTION INTRAVENOUS; SUBCUTANEOUS at 11:55

## 2022-05-02 RX ADMIN — MEGESTROL ACETATE 400 MG: 40 SUSPENSION ORAL at 08:14

## 2022-05-02 RX ADMIN — DOCUSATE SODIUM 50MG AND SENNOSIDES 8.6MG 2 TABLET: 8.6; 5 TABLET, FILM COATED ORAL at 08:14

## 2022-05-02 RX ADMIN — MIDODRINE HYDROCHLORIDE 10 MG: 5 TABLET ORAL at 18:45

## 2022-05-02 RX ADMIN — ATORVASTATIN CALCIUM 20 MG: 20 TABLET, FILM COATED ORAL at 08:14

## 2022-05-02 RX ADMIN — MEGESTROL ACETATE 400 MG: 40 SUSPENSION ORAL at 22:06

## 2022-05-02 RX ADMIN — DOCUSATE SODIUM 100 MG: 100 CAPSULE, LIQUID FILLED ORAL at 08:14

## 2022-05-02 RX ADMIN — Medication 1 APPLICATION: at 08:15

## 2022-05-02 RX ADMIN — IPRATROPIUM BROMIDE AND ALBUTEROL SULFATE 3 ML: .5; 3 SOLUTION RESPIRATORY (INHALATION) at 07:48

## 2022-05-02 RX ADMIN — DOCUSATE SODIUM 50MG AND SENNOSIDES 8.6MG 2 TABLET: 8.6; 5 TABLET, FILM COATED ORAL at 22:15

## 2022-05-02 RX ADMIN — Medication 100 MG: at 08:15

## 2022-05-02 RX ADMIN — MIDODRINE HYDROCHLORIDE 10 MG: 5 TABLET ORAL at 08:19

## 2022-05-02 RX ADMIN — PANTOPRAZOLE SODIUM 40 MG: 40 TABLET, DELAYED RELEASE ORAL at 08:14

## 2022-05-02 RX ADMIN — Medication 10 ML: at 22:08

## 2022-05-02 RX ADMIN — ASPIRIN 81 MG: 81 TABLET, CHEWABLE ORAL at 08:15

## 2022-05-02 NOTE — CASE MANAGEMENT/SOCIAL WORK
Continued Stay Note  [facility]     Patient Name: Thad Light  MRN: [id number]  Today's Date: 5/2/2022    Admit Date: 4/28/2022     Discharge Plan     Row Name 05/02/22 0937       Plan    Plan Return to [facility] PENDING precert.    Plan Comments S/W Mai/ Luanne who states will need a new PT note today and will initiate precert.  PT notified ............Kimberly DOWNEY/ NOHEMI               Discharge Codes    No documentation.               Expected Discharge Date and Time     Expected Discharge Date Expected Discharge Time    May 1, 2022             Kimberly Garza RN

## 2022-05-02 NOTE — PROGRESS NOTES
LOS: 0 days   Patient Care Team:  Qi Ann PA as PCP - General (Physician Assistant)    Chief Complaint: Follow-up near syncope, orthostatic hypotension.    Interval History: Blood pressure is better on the increased dose of midodrine.  No lightheadedness.  No chest pain or shortness of breath.    Vital Signs:  Temp:  [97.1 °F (36.2 °C)-98.6 °F (37 °C)] 97.8 °F (36.6 °C)  Heart Rate:  [70-86] 83  Resp:  [18-20] 19  BP: (111-134)/(68-84) 133/82    Intake/Output Summary (Last 24 hours) at 5/2/2022 0928  Last data filed at 5/1/2022 1930  Gross per 24 hour   Intake 240 ml   Output 700 ml   Net -460 ml       Physical Exam:   General Appearance:    No acute distress, alert and oriented x4, right orbital ecchymosis   Lungs:     Clear to auscultation bilaterally     Heart:    Regular rhythm and normal rate.  No murmurs, gallops, or       rubs.   Abdomen:     Soft, nontender, nondistended.    Extremities:   Moves all extremities well.  No clubbing, cyanosis, or edema.     Results Review:    Results from last 7 days   Lab Units 05/02/22  0819   SODIUM mmol/L 136   POTASSIUM mmol/L 4.3   CHLORIDE mmol/L 107   CO2 mmol/L 19.7*   BUN mg/dL 20   CREATININE mg/dL 0.97   GLUCOSE mg/dL 182*   CALCIUM mg/dL 8.4*     Results from last 7 days   Lab Units 04/29/22  0517 04/28/22  2132 04/28/22  1255   TROPONIN T ng/mL 0.020 0.026 0.033*     Results from last 7 days   Lab Units 05/02/22  0739   WBC 10*3/mm3 7.69   HEMOGLOBIN g/dL 10.4*   HEMATOCRIT % 31.3*   PLATELETS 10*3/mm3 147             Results from last 7 days   Lab Units 04/29/22  0517   MAGNESIUM mg/dL 2.8*           I reviewed the patient's new clinical results.        Assessment:  1.  Near syncope  2.  Orthostatic hypotension  3.  Minimally elevated troponin, nonischemic  4.  Diabetes  5.  PVCs    Plan:  -Blood pressure improved with volume resuscitation and increasing the midodrine.    -Continue midodrine 10 mg 3 times a day as an outpatient.    -Stable for  discharge from a cardiac standpoint.  I will arrange for follow-up with Dr. Winston's nurse practitioner in 1 week.  Cardiology will sign off.    Dinesh Garibay MD  05/02/22  09:28 EDT

## 2022-05-02 NOTE — PLAN OF CARE
Goal Outcome Evaluation:  Plan of Care Reviewed With: patient        Progress: no change  Outcome Evaluation: Patient continues with positive orthostatic hypotension which is limiting ability to progress with PT. He completed STS to rwx requiring Rad and static standing 3min for orthostatics to be taken. BP in sitting (sitting EOB upon arrival to room) was 118/75, standing 1 min 69/49, and standign 3min 79/55. Pt also with labored breathing in standing and cues for PLB. Pt is not safe to discharge to home setting. Pt will continue to benefit from skilled PT and PT rec rehab at discharge.

## 2022-05-02 NOTE — THERAPY TREATMENT NOTE
Patient Name: Thad Light  : 1940    MRN: [id number]                              Today's Date: 2022       Admit Date: 2022    Visit Dx:     ICD-10-CM ICD-9-CM   1. Near syncope  R55 780.2   2. Frequent PVCs  I49.3 427.69   3. Hypomagnesemia  E83.42 275.2   4. Elevated troponin  R77.8 790.6   5. PVC (premature ventricular contraction)  I49.3 427.69   6. Chest pain, atypical  R07.89 786.59     Patient Active Problem List   Diagnosis   • Near syncope   • Hyperlipidemia   • Hypospadias   • Stenosis of urinary meatus   • Low back pain   • Lower urinary tract symptoms due to benign prostatic hyperplasia   • Nocturia   • Overweight with body mass index (BMI) 25.0-29.9   • BPH (benign prostatic hyperplasia)   • Pulmonary hypertension    • Type 2 diabetes mellitus with hyperglycemia    • History of COVID-2022   • PVC (premature ventricular contraction)   • Elevated troponin   • Hypomagnesemia     Past Medical History:   Diagnosis Date   • Anemia    • Arthritis    • BPH (benign prostatic hyperplasia)    • COVID-19    • Diabetes mellitus (HCC)    • GERD (gastroesophageal reflux disease)    • Hyperlipidemia    • Pulmonary hypertension (HCC)    • Respiratory failure (HCC)      History reviewed. No pertinent surgical history.   General Information     Row Name 22 [facility]8          Physical Therapy Time and Intention    Document Type therapy note (daily note)  -CB     Mode of Treatment physical therapy;individual therapy  -CB     Row Name 22 1208          General Information    Patient Profile Reviewed yes  -CB     Existing Precautions/Restrictions fall;orthostatic hypotension  -CB     Row Name 22 1208          Cognition    Orientation Status (Cognition) oriented x 3  -CB     Row Name 22 1208          Safety Issues, Functional Mobility    Impairments Affecting Function (Mobility) balance;endurance/activity tolerance;strength;postural/trunk control  -CB           User Key  (r) =  Recorded By, (t) = Taken By, (c) = Cosigned By    Initials Name Provider Type    CB Bianca Mcbride, PT Physical Therapist               Mobility     Row Name 05/02/22 1208          Bed Mobility    Comment, (Bed Mobility) sitting EOB  -CB     Row Name 05/02/22 1208          Bed-Chair Transfer    Bed-Chair [place] (Transfers) unable to assess  -CB     Row Name 05/02/22 1208          Sit-Stand Transfer    Sit-Stand [place] (Transfers) minimum assist (75% patient effort);verbal cues;nonverbal cues (demo/gesture)  -CB     Assistive Device (Sit-Stand Transfers) walker, front-wheeled  -CB     Row Name 05/02/22 1208          Gait/Stairs (Locomotion)    [place] Level (Gait) unable to assess  -CB           User Key  (r) = Recorded By, (t) = Taken By, (c) = Cosigned By    Initials Name Provider Type    CB Bianca Mcbride PT Physical Therapist               Obj/Interventions     Row Name 05/02/22 1209          Balance    Balance Assessment sitting static balance;sitting dynamic balance;standing static balance;standing dynamic balance  -CB     Static Sitting Balance modified independence  -CB     Dynamic Sitting Balance modified independence  -CB     Position, Sitting Balance unsupported;sitting edge of bed  -CB     Static Standing Balance contact guard  -CB     Dynamic Standing Balance contact guard  -CB     Position/Device Used, Standing Balance supported;walker, rolling  -CB     Balance Interventions sitting;standing;supported;sit to stand;static;dynamic;minimal challenge  -CB     Comment, Balance static standing 3min  -CB           User Key  (r) = Recorded By, (t) = Taken By, (c) = Cosigned By    Initials Name Provider Type    CB Bianca Mcbride, ALVARADO Physical Therapist               Goals/Plan    No documentation.                Clinical Impression     Row Name 05/02/22 1209          Pain    Pretreatment Pain Rating 0/10 - no pain  -CB     Posttreatment Pain Rating 0/10 - no pain  -CB     Row Name 05/02/22 1209           Plan of Care Review    Plan of Care Reviewed With patient  -CB     Progress no change  -CB     Outcome Evaluation Patient continues with positive orthostatic hypotension which is limiting ability to progress with PT. He completed STS to rwx requiring Rad and static standing 3min for orthostatics to be taken. BP in sitting (sitting EOB upon arrival to room) was 118/75, standing 1 min 69/49, and standign 3min 79/55. Pt also with labored breathing in standing and cues for PLB. Pt is not safe to discharge to home setting. Pt will continue to benefit from skilled PT and PT rec rehab at discharge.  -CB     Row Name 05/02/22 1209          Positioning and Restraints    Pre-Treatment Position in bed  -CB     Post Treatment Position bed  -CB     In Bed sitting EOB;call light within reach;encouraged to call for assist;exit alarm on;side rails up x2;with family/caregiver;with nsg  -CB           User Key  (r) = Recorded By, (t) = Taken By, (c) = Cosigned By    Initials Name Provider Type    Bianca Estes PT Physical Therapist               Outcome Measures     Row Name 05/02/22 1212          How much help from another person do you currently need...    Turning from your back to your side while in flat bed without using bedrails? 3  -CB     Moving from lying on back to sitting on the side of a flat bed without bedrails? 3  -CB     Moving to and from a bed to a chair (including a wheelchair)? 3  -CB     Standing up from a chair using your arms (e.g., wheelchair, bedside chair)? 3  -CB     Climbing 3-5 steps with a railing? 2  -CB     To walk in hospital room? 2  -CB     AM-PAC 6 Clicks Score (PT) 16  -CB     Highest level of mobility 5 --> Static standing  -CB     Row Name 05/02/22 1212          Functional Assessment    Outcome Measure Options AM-PAC 6 Clicks Basic Mobility (PT)  -CB           User Key  (r) = Recorded By, (t) = Taken By, (c) = Cosigned By    Initials Name Provider Type    Bianca Estes PT  Physical Therapist                             Physical Therapy Education                 Title: PT OT SLP Therapies (Done)     Topic: Physical Therapy (Done)     Point: Mobility training (Done)     Learning Progress Summary           Patient Acceptance, E,TB,D, VU,NR by CB at 5/2/2022 1212    Acceptance, E,TB,D, VU,NR by CB at 4/29/2022 1147                   Point: Home exercise program (Done)     Learning Progress Summary           Patient Acceptance, E,TB,D, VU,NR by CB at 4/29/2022 1147                   Point: Body mechanics (Done)     Learning Progress Summary           Patient Acceptance, E,TB,D, VU,NR by CB at 5/2/2022 1212    Acceptance, E,TB,D, VU,NR by CB at 4/29/2022 1147                   Point: Precautions (Done)     Learning Progress Summary           Patient Acceptance, E,TB,D, VU,NR by CB at 5/2/2022 1212    Acceptance, E,TB,D, VU,NR by CB at 4/29/2022 1147                               User Key     Initials Effective Dates Name Provider Type Discipline     10/22/21 -  Bianca Mcbride, PT Physical Therapist PT              PT Recommendation and Plan  Planned Therapy Interventions (PT): balance training, gait training, bed mobility training, home exercise program, patient/family education, strengthening, transfer training, postural re-education  Plan of Care Reviewed With: patient  Progress: no change  Outcome Evaluation: Patient continues with positive orthostatic hypotension which is limiting ability to progress with PT. He completed STS to rwx requiring Rad and static standing 3min for orthostatics to be taken. BP in sitting (sitting EOB upon arrival to room) was 118/75, standing 1 min 69/49, and standign 3min 79/55. Pt also with labored breathing in standing and cues for PLB. Pt is not safe to discharge to home setting. Pt will continue to benefit from skilled PT and PT rec rehab at discharge.     Time Calculation:    PT Charges     Row Name 05/02/22 1212             Time Calculation    Start  Time 1138  -CB      Stop Time 1202  -CB      Time Calculation (min) 24 min  -CB      PT Received On 05/02/22  -CB      PT - Next Appointment 05/03/22  -CB              Time Calculation- PT    Total Timed Code Minutes- PT 24 minute(s)  -CB              Timed Charges    92498 - PT Therapeutic Activity Minutes 24  -CB              Total Minutes    Timed Charges Total Minutes 24  -CB       Total Minutes 24  -CB            User Key  (r) = Recorded By, (t) = Taken By, (c) = Cosigned By    Initials Name Provider Type    CB Bianca Mcbride, PT Physical Therapist              Therapy Charges for Today     Code Description Service Date Service Provider Modifiers Qty    73054181907  PT THERAPEUTIC ACT EA 15 MIN 5/2/2022 Bianca Mcbride, PT GP 2          PT G-Codes  Outcome Measure Options: AM-PAC 6 Clicks Basic Mobility (PT)  AM-PAC 6 Clicks Score (PT): 16  AM-PAC 6 Clicks Score (OT): 13  Modified Motley Scale: 4 - Moderately severe disability.  Unable to walk without assistance, and unable to attend to own bodily needs without assistance.    Bianca Mcbride, ALVARADO  5/2/2022

## 2022-05-02 NOTE — PROGRESS NOTES
Name: Thad Light ADMIT: 2022   : 1940  PCP: Qi Ann PA    MRN: [id number] LOS: 0 days   AGE/SEX: 81 y.o. male  ROOM: [facility]     Subjective   Subjective   No real new issues today.    Review of Systems     Objective   Objective   Vital Signs  Temp:  [97.8 °F (36.6 °C)-98.6 °F (37 °C)] 98.4 °F (36.9 °C)  Heart Rate:  [70-83] 78  Resp:  [18-19] 18  BP: (111-134)/(68-93) 129/93  SpO2:  [90 %-97 %] 97 %  on   ;   Device (Oxygen Therapy): room air  Body mass index is 22.32 kg/m².  Physical Exam  Constitutional:       General: He is not in acute distress.     Appearance: He is not diaphoretic.   Cardiovascular:      Rate and Rhythm: Normal rate and regular rhythm.      Heart sounds: Normal heart sounds.   Pulmonary:      Effort: Pulmonary effort is normal.      Breath sounds: Normal breath sounds.   Abdominal:      General: Bowel sounds are normal.      Palpations: Abdomen is soft.      Tenderness: There is no abdominal tenderness.   Musculoskeletal:         General: No swelling.   Skin:     General: Skin is warm and dry.      Findings: Bruising present.   Neurological:      Mental Status: He is alert and oriented to person, place, and time.         Results Review     I reviewed the patient's new clinical results.  Results from last 7 days   Lab Units 22  0739 22  0747 22  0622  0517   WBC 10*3/mm3 7.69 8.29 8.71 8.25   HEMOGLOBIN g/dL 10.4* 10.0* 9.5* 10.4*   PLATELETS 10*3/mm3 147 131* 141 146     Results from last 7 days   Lab Units 22  0819 22  0747 22  0601 22  0517   SODIUM mmol/L 136 139 140 141   POTASSIUM mmol/L 4.3 4.6 4.3 4.4   CHLORIDE mmol/L 107 108* 109* 111*   CO2 mmol/L 19.7* 19.0* 17.5* 18.7*   BUN mg/dL 20 19 21 17   CREATININE mg/dL 0.97 0.93 1.11 0.90   GLUCOSE mg/dL 182* 157* 176* 169*   EGFR mL/min/1.73 77.5 81.5 65.9 84.7     Results from last 7 days   Lab Units 22  0819 22  1255   ALBUMIN g/dL 3.20* 3.60    BILIRUBIN mg/dL 0.2 0.3   ALK PHOS U/L 48 39   AST (SGOT) U/L 13 17   ALT (SGPT) U/L 24 22     Results from last 7 days   Lab Units 05/02/22  0819 05/01/22  0747 04/30/22  0601 04/29/22  0517 04/28/22  1255   CALCIUM mg/dL 8.4* 8.5* 8.3* 8.5* 8.8   ALBUMIN g/dL 3.20*  --   --   --  3.60   MAGNESIUM mg/dL  --   --   --  2.8* 1.4*   PHOSPHORUS mg/dL  --   --   --  3.6  --         Results from last 7 days   Lab Units 04/29/22  0517 04/28/22 2132 04/28/22  1255   TROPONIN T ng/mL 0.020 0.026 0.033*   PROBNP pg/mL  --   --  1,468.0     Glucose   Date/Time Value Ref Range Status   05/02/2022 1127 163 (H) 70 - 130 mg/dL Final     Comment:     Meter: [id number] : [id number] Eileen Stanton NA   05/02/2022 0629 199 (H) 70 - 130 mg/dL Final     Comment:     Meter: [id number] : [id number] Ronald Villagomez RN   05/01/2022 2129 218 (H) 70 - 130 mg/dL Final     Comment:     Meter: [id number] : [id number] Ronald Villagomez RN   05/01/2022 1621 197 (H) 70 - 130 mg/dL Final     Comment:     Meter: [id number] : [id number] Skipblayne Robertsonl NA   05/01/2022 1122 137 (H) 70 - 130 mg/dL Final     Comment:     Meter: [id number] : [id number] Tera Harding NA   05/01/2022 0609 192 (H) 70 - 130 mg/dL Final     Comment:     Meter: [id number] : [id number] Cristine VILLASEÑOR   04/30/2022 2100 244 (H) 70 - 130 mg/dL Final     Comment:     Meter: [id number] : [id number] Cristine VILLASEÑOR       No radiology results for the last day  Scheduled Medications  aspirin, 81 mg, Oral, Daily  atorvastatin, 20 mg, Oral, Daily  insulin lispro, 0-9 Units, Subcutaneous, TID AC  ipratropium-albuterol, 3 mL, Nebulization, 4x Daily - RT  megestrol, 400 mg, Oral, BID  miconazole, 1 application, Topical, BID  midodrine, 10 mg, Oral, TID AC  neomycin-bacitracin-polymyxin b, 1 application, Topical, Daily  pantoprazole, 40 mg, Oral, Daily  senna-docusate sodium, 2 tablet, Oral, BID  sodium bicarbonate, 650 mg, Oral, BID  sodium chloride, 10 mL, Intravenous,  Q12H  tamsulosin, 0.4 mg, Oral, Nightly  thiamine, 100 mg, Oral, Daily    Infusions   Diet  Diet Regular; Consistent Carbohydrate       Assessment/Plan     Active Hospital Problems    Diagnosis  POA   • **Orthostatic hypotension [I95.1]  Yes   • BPH (benign prostatic hyperplasia) [N40.0]  Yes   • Pulmonary hypertension  [I27.20]  Yes   • Type 2 diabetes mellitus with hyperglycemia, without long-term current use of insulin (HCC) [E11.65]  Yes   • History of COVID-19 jan 2022 [Z86.16]  Yes   • PVC (premature ventricular contraction) [I49.3]  Yes   • Elevated troponin [R77.8]  Yes      Resolved Hospital Problems    Diagnosis Date Resolved POA   • Hypomagnesemia [E83.42] 05/02/2022 Yes       81 y.o. male admitted with Orthostatic hypotension.    Cardiology has signed off.  Midodrine increased.  Still somewhat orthostatic and unsteady on feet.  Will add lower extremity compression devices.  CCP working on pre-CERT for placement.      · SCDs for DVT prophylaxis.  · Full code.  · Discussed with patient, family, CCP and care team on multidisciplinary rounds.  · Anticipate discharge to SNU facility once precert has been obtained.      Kip Kc MD  [facility]  05/02/22  16:26 EDT

## 2022-05-03 LAB
GLUCOSE BLDC GLUCOMTR-MCNC: 190 MG/DL (ref 70–130)
GLUCOSE BLDC GLUCOMTR-MCNC: 198 MG/DL (ref 70–130)
GLUCOSE BLDC GLUCOMTR-MCNC: 209 MG/DL (ref 70–130)
GLUCOSE BLDC GLUCOMTR-MCNC: 259 MG/DL (ref 70–130)

## 2022-05-03 PROCEDURE — G0378 HOSPITAL OBSERVATION PER HR: HCPCS

## 2022-05-03 PROCEDURE — 94664 DEMO&/EVAL PT USE INHALER: CPT

## 2022-05-03 PROCEDURE — 94799 UNLISTED PULMONARY SVC/PX: CPT

## 2022-05-03 PROCEDURE — 97530 THERAPEUTIC ACTIVITIES: CPT

## 2022-05-03 PROCEDURE — 94669 MECHANICAL CHEST WALL OSCILL: CPT

## 2022-05-03 PROCEDURE — 82962 GLUCOSE BLOOD TEST: CPT

## 2022-05-03 PROCEDURE — 94668 MNPJ CHEST WALL SBSQ: CPT

## 2022-05-03 PROCEDURE — 63710000001 INSULIN LISPRO (HUMAN) PER 5 UNITS: Performed by: INTERNAL MEDICINE

## 2022-05-03 PROCEDURE — 94761 N-INVAS EAR/PLS OXIMETRY MLT: CPT

## 2022-05-03 RX ADMIN — SODIUM BICARBONATE 650 MG: 650 TABLET ORAL at 00:56

## 2022-05-03 RX ADMIN — IPRATROPIUM BROMIDE AND ALBUTEROL SULFATE 3 ML: .5; 3 SOLUTION RESPIRATORY (INHALATION) at 11:28

## 2022-05-03 RX ADMIN — DOCUSATE SODIUM 50MG AND SENNOSIDES 8.6MG 2 TABLET: 8.6; 5 TABLET, FILM COATED ORAL at 09:36

## 2022-05-03 RX ADMIN — MEGESTROL ACETATE 400 MG: 40 SUSPENSION ORAL at 20:59

## 2022-05-03 RX ADMIN — IPRATROPIUM BROMIDE AND ALBUTEROL SULFATE 3 ML: .5; 3 SOLUTION RESPIRATORY (INHALATION) at 15:14

## 2022-05-03 RX ADMIN — TAMSULOSIN HYDROCHLORIDE 0.4 MG: 0.4 CAPSULE ORAL at 20:58

## 2022-05-03 RX ADMIN — IPRATROPIUM BROMIDE AND ALBUTEROL SULFATE 3 ML: .5; 3 SOLUTION RESPIRATORY (INHALATION) at 07:56

## 2022-05-03 RX ADMIN — SODIUM BICARBONATE 650 MG: 650 TABLET ORAL at 20:58

## 2022-05-03 RX ADMIN — IPRATROPIUM BROMIDE AND ALBUTEROL SULFATE 3 ML: .5; 3 SOLUTION RESPIRATORY (INHALATION) at 19:40

## 2022-05-03 RX ADMIN — PANTOPRAZOLE SODIUM 40 MG: 40 TABLET, DELAYED RELEASE ORAL at 09:44

## 2022-05-03 RX ADMIN — ATORVASTATIN CALCIUM 20 MG: 20 TABLET, FILM COATED ORAL at 09:36

## 2022-05-03 RX ADMIN — SODIUM BICARBONATE 650 MG: 650 TABLET ORAL at 09:36

## 2022-05-03 RX ADMIN — MICONAZOLE NITRATE 1 APPLICATION: 20 CREAM TOPICAL at 09:37

## 2022-05-03 RX ADMIN — MIDODRINE HYDROCHLORIDE 10 MG: 5 TABLET ORAL at 16:53

## 2022-05-03 RX ADMIN — MEGESTROL ACETATE 400 MG: 40 SUSPENSION ORAL at 09:36

## 2022-05-03 RX ADMIN — ASPIRIN 81 MG: 81 TABLET, CHEWABLE ORAL at 09:36

## 2022-05-03 RX ADMIN — Medication 100 MG: at 09:36

## 2022-05-03 RX ADMIN — Medication 10 ML: at 09:47

## 2022-05-03 RX ADMIN — MIDODRINE HYDROCHLORIDE 10 MG: 5 TABLET ORAL at 09:40

## 2022-05-03 RX ADMIN — Medication 10 ML: at 22:12

## 2022-05-03 RX ADMIN — MIDODRINE HYDROCHLORIDE 10 MG: 5 TABLET ORAL at 12:05

## 2022-05-03 RX ADMIN — MICONAZOLE NITRATE 1 APPLICATION: 20 CREAM TOPICAL at 22:11

## 2022-05-03 RX ADMIN — INSULIN LISPRO 2 UNITS: 100 INJECTION, SOLUTION INTRAVENOUS; SUBCUTANEOUS at 09:36

## 2022-05-03 RX ADMIN — INSULIN LISPRO 4 UNITS: 100 INJECTION, SOLUTION INTRAVENOUS; SUBCUTANEOUS at 16:54

## 2022-05-03 RX ADMIN — Medication 1 APPLICATION: at 09:37

## 2022-05-03 RX ADMIN — INSULIN LISPRO 6 UNITS: 100 INJECTION, SOLUTION INTRAVENOUS; SUBCUTANEOUS at 12:05

## 2022-05-03 NOTE — CASE MANAGEMENT/SOCIAL WORK
Continued Stay Note  [facility]     Patient Name: Thad Light  MRN: [id number]  Today's Date: 5/3/2022    Admit Date: 4/28/2022     Discharge Plan     Row Name 05/03/22 1559       Plan    Plan [facility] - precert received for tomorrow.    Plan Comments S/W Mai/ Luanne who confirms precert received for admission to [facility] tomorrow. Pt and his wife Becky notified and are in agreement ...........Kimberly DOWNEY/ CCP               Discharge Codes    No documentation.               Expected Discharge Date and Time     Expected Discharge Date Expected Discharge Time    May 4, 2022             Kimberly Garza RN

## 2022-05-03 NOTE — CASE MANAGEMENT/SOCIAL WORK
Continued Stay Note  [facility]     Patient Name: Thad Light  MRN: [id number]  Today's Date: 5/3/2022    Admit Date: 4/28/2022     Discharge Plan     Row Name 05/03/22 1205       Plan    Plan Plan is return to skilled bed at [facility] PENDING precert.    Plan Comments S/W Mai/ Luanne who confirms precert has been iniatiated.  She requests updated PT notes.  Pt now has compression hose and await PT to work with him today.  ...........Kimberly DOWNEY/ CCP               Discharge Codes    No documentation.               Expected Discharge Date and Time     Expected Discharge Date Expected Discharge Time    May 4, 2022             Kimberly Garza RN

## 2022-05-03 NOTE — THERAPY TREATMENT NOTE
Patient Name: Thad Light  : 1940    MRN: [id number]                              Today's Date: 5/3/2022       Admit Date: 2022    Visit Dx:     ICD-10-CM ICD-9-CM   1. Near syncope  R55 780.2   2. Frequent PVCs  I49.3 427.69   3. Hypomagnesemia  E83.42 275.2   4. Elevated troponin  R77.8 790.6   5. PVC (premature ventricular contraction)  I49.3 427.69   6. Chest pain, atypical  R07.89 786.59     Patient Active Problem List   Diagnosis   • Orthostatic hypotension   • Hyperlipidemia   • Hypospadias   • Stenosis of urinary meatus   • Low back pain   • Lower urinary tract symptoms due to benign prostatic hyperplasia   • Nocturia   • Overweight with body mass index (BMI) 25.0-29.9   • BPH (benign prostatic hyperplasia)   • Pulmonary hypertension    • Type 2 diabetes mellitus with hyperglycemia, without long-term current use of insulin (HCC)   • History of COVID-2022   • PVC (premature ventricular contraction)   • Elevated troponin     Past Medical History:   Diagnosis Date   • Anemia    • Arthritis    • BPH (benign prostatic hyperplasia)    • COVID-19    • Diabetes mellitus (HCC)    • GERD (gastroesophageal reflux disease)    • Hyperlipidemia    • Pulmonary hypertension (HCC)    • Respiratory failure (HCC)      History reviewed. No pertinent surgical history.   General Information     Row Name 22 [facility]          OT Time and Intention    Document Type therapy note (daily note)  -MW     Mode of Treatment individual therapy;occupational therapy  -     Row Name 22 1205          General Information    Patient Profile Reviewed yes  -MW     Existing Precautions/Restrictions fall;orthostatic hypotension  -     Row Name 22 [facility]          Cognition    Orientation Status (Cognition) oriented x 3  -MW     Row Name 22 1205          Safety Issues, Functional Mobility    Impairments Affecting Function (Mobility) balance;endurance/activity tolerance;strength;postural/trunk control  -MW            User Key  (r) = Recorded By, (t) = Taken By, (c) = Cosigned By    Initials Name Provider Type    Justa Stevens OT Occupational Therapist                 Mobility/ADL's     Row Name 05/03/22 1206          Bed Mobility    Bed Mobility supine-sit;sit-supine  -     Supine-Sit [place] (Bed Mobility) standby assist;verbal cues  -     Sit-Supine [place] (Bed Mobility) standby assist;verbal cues  -     Assistive Device (Bed Mobility) head of bed elevated  -     Row Name 05/03/22 1206          Transfers    Transfers sit-stand transfer;stand-sit transfer  -     Bed-Chair [place] (Transfers) unable to assess  -     Sit-Stand [place] (Transfers) contact guard  -     Stand-Sit [place] (Transfers) contact guard  -[facility] Name 05/03/22 1206          Sit-Stand Transfer    Assistive Device (Sit-Stand Transfers) walker, front-wheeled  -     Row Name 05/03/22 1206          Stand-Sit Transfer    Assistive Device (Stand-Sit Transfers) walker, front-wheeled  -[facility] Name 05/03/22 1206          Functional Mobility    Functional Mobility- Ind. Level not appropriate to assess  -[facility] Name 05/03/22 1206          Activities of Daily Living    BADL Assessment/Intervention toileting  -     Row Name 05/03/22 1206          Toileting Assessment/Training    Comment, (Toileting) spoke with nurse aide, pt getting up to [facility] for toileting  -           User Key  (r) = Recorded By, (t) = Taken By, (c) = Cosigned By    Initials Name Provider Type    Justa Stevens OT Occupational Therapist               Obj/Interventions     Row Name 05/03/22 1207          Motor Skills    Functional Endurance poor, increased SOA with changes in position, cues for PLB tech  -     Row Name 05/03/22 1207          Balance    Balance Assessment sitting static balance;sitting dynamic balance;sit to stand dynamic balance;standing static balance  -     Static Sitting Balance modified independence   -MW     Dynamic Sitting Balance modified independence  -MW     Position, Sitting Balance supported;sitting edge of bed  -MW     Sit to Stand Dynamic Balance contact guard  -MW     Static Standing Balance contact guard  -MW     Dynamic Standing Balance not tested  -MW     Position/Device Used, Standing Balance supported;walker, front-wheeled  -MW     Comment, Balance standing ~3 mins for BP reading to complete  -MW           User Key  (r) = Recorded By, (t) = Taken By, (c) = Cosigned By    Initials Name Provider Type    Justa Stevens, CLARITA Occupational Therapist               Goals/Plan    No documentation.                Clinical Impression     Row Name 05/03/22 1208          Pain Assessment    Pretreatment Pain Rating 0/10 - no pain  -MW     Posttreatment Pain Rating 0/10 - no pain  -MW     Row Name 05/03/22 1208          Plan of Care Review    Plan of Care Reviewed With patient;daughter;spouse  -MW     Progress no change  -MW     Outcome Evaluation Pt seen for OT tx session this AM, positive for orthostatic hypotension in sitting vs standing positions this date. bed mob completed with SBA. BP in supine 136/91, sitting /85, upright static standing up to 3 mins dropping to 91/55. Pt with labored breathing upon coming to stand, cues for controlled breathing tech. Unable to progress with functional mobility and completion of ADLs 2/2 orthostatics. Will continue to progress as able as pt will continue to benefit from skilled OT to address deficits.  -     Row Name 05/03/22 1208          Therapy Plan Review/Discharge Plan (OT)    Anticipated Discharge Disposition (OT) skilled nursing facility  -     Row Name 05/03/22 1208          Vital Signs    Pre Systolic BP Rehab 139  -MW     Pre Treatment Diastolic BP 91  -MW     Intra Systolic BP Rehab 118  -MW     Intra Treatment Diastolic BP 85  -MW     Post Systolic BP Rehab 91  -MW     Post Treatment Diastolic BP 55  -MW     O2 Delivery Pre Treatment room air   -MW     Pre Patient Position Supine  -MW     Intra Patient Position Standing  -MW     Post Patient Position Supine  -MW     Row Name 05/03/22 1208          Positioning and Restraints    Pre-Treatment Position in bed  -MW     Post Treatment Position bed  -MW     In Bed notified nsg;supine;call light within reach;encouraged to call for assist;exit alarm on;with family/caregiver  -MW           User Key  (r) = Recorded By, (t) = Taken By, (c) = Cosigned By    Initials Name Provider Type    Justa Stevens OT Occupational Therapist               Outcome Measures     Row Name 05/03/22 1212          How much help from another is currently needed...    Putting on and taking off regular lower body clothing? 3  -MW     Bathing (including washing, rinsing, and drying) 3  -MW     Toileting (which includes using toilet bed pan or urinal) 2  -MW     Putting on and taking off regular upper body clothing 3  -MW     Taking care of personal grooming (such as brushing teeth) 3  -MW     Eating meals 3  -MW     AM-PAC 6 Clicks Score (OT) 17  -MW     Row Name 05/03/22 1212          Functional Assessment    Outcome Measure Options AM-PAC 6 Clicks Daily Activity (OT)  -           User Key  (r) = Recorded By, (t) = Taken By, (c) = Cosigned By    Initials Name Provider Type    Justa Stevens OT Occupational Therapist                Occupational Therapy Education                 Title: PT OT SLP Therapies (Done)     Topic: Occupational Therapy (Done)     Point: ADL training (Done)     Description:   Instruct learner(s) on proper safety adaptation and remediation techniques during self care or transfers.   Instruct in proper use of assistive devices.              Learning Progress Summary           Patient Acceptance, E, VU by JONNA at 4/29/2022 1211                               User Key     Initials Effective Dates Name Provider Type Shasha COYLE 02/22/22 -  Citlalli Moreland, OT Occupational Therapist OT              OT  Recommendation and Plan     Plan of Care Review  Plan of Care Reviewed With: patient, daughter, spouse  Progress: no change  Outcome Evaluation: Pt seen for OT tx session this AM, positive for orthostatic hypotension in sitting vs standing positions this date. bed mob completed with SBA. BP in supine 136/91, sitting /85, upright static standing up to 3 mins dropping to 91/55. Pt with labored breathing upon coming to stand, cues for controlled breathing tech. Unable to progress with functional mobility and completion of ADLs 2/2 orthostatics. Will continue to progress as able as pt will continue to benefit from skilled OT to address deficits.     Time Calculation:    Time Calculation- OT     Row Name 05/03/22 1213             Time Calculation- OT    OT Start Time 1023  -MW      OT Stop Time 1047  -MW      OT Time Calculation (min) 24 min  -MW      Total Timed Code Minutes- OT 24 minute(s)  -MW      OT Received On 05/03/22  -MW      OT - Next Appointment 05/04/22  -MW              Timed Charges    74029 - OT Therapeutic Activity Minutes 24  -MW              Total Minutes    Timed Charges Total Minutes 24  -MW       Total Minutes 24  -MW            User Key  (r) = Recorded By, (t) = Taken By, (c) = Cosigned By    Initials Name Provider Type    MW Justa Mayen OT Occupational Therapist              Therapy Charges for Today     Code Description Service Date Service Provider Modifiers Qty    [id number]  OT THERAPEUTIC ACT EA 15 MIN 5/3/2022 Justa Mayen OT GO 2               Justa Mayen OT  5/3/2022

## 2022-05-03 NOTE — PLAN OF CARE
Goal Outcome Evaluation:  Plan of Care Reviewed With: patient, daughter, spouse        Progress: no change  Pt seen for OT tx session this AM, positive for orthostatic hypotension in sitting vs standing positions this date. bed mob completed with SBA. BP in supine 136/91, sitting /85, upright static standing up to 3 mins dropping to 91/55. Pt with labored breathing upon coming to stand, cues for controlled breathing tech. Increased time required at EOB for pt to recover after static standing activity, stats above 90% however on RA. Unable to progress with functional mobility and completion of ADLs 2/2 orthostatics. Will continue to progress as able as pt will continue to benefit from skilled OT to address deficits.  Therapist in mask, gloves and hand hygiene performed.

## 2022-05-03 NOTE — PLAN OF CARE
Goal Outcome Evaluation:  Plan of Care Reviewed With: patient        Progress: improving     Patients blood pressure more stable today at times has been running systolic in the 90s still taking Midodrine and seems to have no dizziness and has not had further issues. Patient has precert for room at [facility] awaiting for ambulance for tomorrow.

## 2022-05-03 NOTE — PROGRESS NOTES
Name: Thad Light ADMIT: 2022   : 1940  PCP: Qi Ann PA    MRN: [id number] LOS: 0 days   AGE/SEX: 81 y.o. male  ROOM: [email or web address]     Subjective   Subjective   No new issues overnight.    Review of Systems     Objective   Objective   Vital Signs  Temp:  [98.4 °F (36.9 °C)-98.6 °F (37 °C)] 98.4 °F (36.9 °C)  Heart Rate:  [74-86] 74  Resp:  [18] 18  BP: (122-129)/(79-93) 122/80  SpO2:  [96 %-100 %] 97 %  on  Flow (L/min):  [2] 2;   Device (Oxygen Therapy): room air  Body mass index is 22.32 kg/m².  Physical Exam  Constitutional:       General: He is not in acute distress.     Appearance: He is not diaphoretic.   Cardiovascular:      Rate and Rhythm: Normal rate and regular rhythm.      Heart sounds: Normal heart sounds.   Pulmonary:      Effort: Pulmonary effort is normal.      Breath sounds: Normal breath sounds.   Abdominal:      General: Bowel sounds are normal.      Palpations: Abdomen is soft.      Tenderness: There is no abdominal tenderness.   Musculoskeletal:         General: No swelling.   Skin:     General: Skin is warm and dry.      Findings: Bruising present.   Neurological:      Mental Status: He is alert and oriented to person, place, and time.         Results Review     I reviewed the patient's new clinical results.  Results from last 7 days   Lab Units 22  0739 22  0747 22  0622  0517   WBC 10*3/mm3 7.69 8.29 8.71 8.25   HEMOGLOBIN g/dL 10.4* 10.0* 9.5* 10.4*   PLATELETS 10*3/mm3 147 131* 141 146     Results from last 7 days   Lab Units 22  0819 22  0747 22  0601 22  0517   SODIUM mmol/L 136 139 140 141   POTASSIUM mmol/L 4.3 4.6 4.3 4.4   CHLORIDE mmol/L 107 108* 109* 111*   CO2 mmol/L 19.7* 19.0* 17.5* 18.7*   BUN mg/dL 20 19 21 17   CREATININE mg/dL 0.97 0.93 1.11 0.90   GLUCOSE mg/dL 182* 157* 176* 169*   EGFR mL/min/1.73 77.5 81.5 65.9 84.7     Results from last 7 days   Lab Units 22  0819 22  1255   ALBUMIN  g/dL 3.20* 3.60   BILIRUBIN mg/dL 0.2 0.3   ALK PHOS U/L 48 39   AST (SGOT) U/L 13 17   ALT (SGPT) U/L 24 22     Results from last 7 days   Lab Units 05/02/22  0819 05/01/22  0747 04/30/22  0601 04/29/22  0517 04/28/22  1255   CALCIUM mg/dL 8.4* 8.5* 8.3* 8.5* 8.8   ALBUMIN g/dL 3.20*  --   --   --  3.60   MAGNESIUM mg/dL  --   --   --  2.8* 1.4*   PHOSPHORUS mg/dL  --   --   --  3.6  --         Results from last 7 days   Lab Units 04/29/22 0517 04/28/22 2132 04/28/22  1255   TROPONIN T ng/mL 0.020 0.026 0.033*   PROBNP pg/mL  --   --  1,468.0     Glucose   Date/Time Value Ref Range Status   05/03/2022 0629 198 (H) 70 - 130 mg/dL Final     Comment:     Meter: [id number] : [id number] Silas Middleton NA   05/02/2022 2127 243 (H) 70 - 130 mg/dL Final     Comment:     Meter: [id number] : [id number] Ronald Villagomez RN   05/02/2022 1127 163 (H) 70 - 130 mg/dL Final     Comment:     Meter: [id number] : [id number] Eileen VILLASEÑOR   05/02/2022 0629 199 (H) 70 - 130 mg/dL Final     Comment:     Meter: [id number] : [id number] Ronald Villagomez RN   05/01/2022 2129 218 (H) 70 - 130 mg/dL Final     Comment:     Meter: [id number] : [id number] Ronald Villagomez RN   05/01/2022 1621 197 (H) 70 - 130 mg/dL Final     Comment:     Meter: [id number] : [id number] Jing Lal NA   05/01/2022 1122 137 (H) 70 - 130 mg/dL Final     Comment:     Meter: [id number] : [id number] Tera VILLASEÑOR       No radiology results for the last day  Scheduled Medications  aspirin, 81 mg, Oral, Daily  atorvastatin, 20 mg, Oral, Daily  insulin lispro, 0-9 Units, Subcutaneous, TID AC  ipratropium-albuterol, 3 mL, Nebulization, 4x Daily - RT  megestrol, 400 mg, Oral, BID  miconazole, 1 application, Topical, BID  midodrine, 10 mg, Oral, TID AC  neomycin-bacitracin-polymyxin b, 1 application, Topical, Daily  pantoprazole, 40 mg, Oral, Daily  senna-docusate sodium, 2 tablet, Oral, BID  sodium bicarbonate, 650 mg, Oral, BID  sodium chloride, 10 mL,  Intravenous, Q12H  tamsulosin, 0.4 mg, Oral, Nightly  thiamine, 100 mg, Oral, Daily    Infusions   Diet  Diet Regular; Consistent Carbohydrate       Assessment/Plan     Active Hospital Problems    Diagnosis  POA   • **Orthostatic hypotension [I95.1]  Yes   • BPH (benign prostatic hyperplasia) [N40.0]  Yes   • Pulmonary hypertension  [I27.20]  Yes   • Type 2 diabetes mellitus with hyperglycemia, without long-term current use of insulin (HCC) [E11.65]  Yes   • History of COVID-19 jan 2022 [Z86.16]  Yes   • PVC (premature ventricular contraction) [I49.3]  Yes   • Elevated troponin [R77.8]  Yes      Resolved Hospital Problems    Diagnosis Date Resolved POA   • Hypomagnesemia [E83.42] 05/02/2022 Yes       81 y.o. male admitted with Orthostatic hypotension.    Comression stockings not yet delivered.  Orthostatics this morning shows that he is still mildly orthostatic.  Hopefully compression stockings help.  He is on midodrine as well per cardiology.      · SCDs for DVT prophylaxis.  · Full code.  · Discussed with patient and care team on multidisciplinary rounds.  · Anticipate discharge to SNU facility once precert has been obtained.      Kip Kc MD  [facility]  05/03/22  08:02 EDT

## 2022-05-04 VITALS
WEIGHT: 173 LBS | DIASTOLIC BLOOD PRESSURE: 71 MMHG | HEART RATE: 91 BPM | HEIGHT: 74 IN | OXYGEN SATURATION: 99 % | TEMPERATURE: 98.1 F | SYSTOLIC BLOOD PRESSURE: 114 MMHG | RESPIRATION RATE: 18 BRPM | BODY MASS INDEX: 22.2 KG/M2

## 2022-05-04 LAB
GLUCOSE BLDC GLUCOMTR-MCNC: 166 MG/DL (ref 70–130)
GLUCOSE BLDC GLUCOMTR-MCNC: 175 MG/DL (ref 70–130)
GLUCOSE BLDC GLUCOMTR-MCNC: 228 MG/DL (ref 70–130)
SARS-COV-2 RNA RESP QL NAA+PROBE: NOT DETECTED

## 2022-05-04 PROCEDURE — G0378 HOSPITAL OBSERVATION PER HR: HCPCS

## 2022-05-04 PROCEDURE — 94799 UNLISTED PULMONARY SVC/PX: CPT

## 2022-05-04 PROCEDURE — 82962 GLUCOSE BLOOD TEST: CPT

## 2022-05-04 PROCEDURE — U0003 INFECTIOUS AGENT DETECTION BY NUCLEIC ACID (DNA OR RNA); SEVERE ACUTE RESPIRATORY SYNDROME CORONAVIRUS 2 (SARS-COV-2) (CORONAVIRUS DISEASE [COVID-19]), AMPLIFIED PROBE TECHNIQUE, MAKING USE OF HIGH THROUGHPUT TECHNOLOGIES AS DESCRIBED BY CMS-2020-01-R: HCPCS | Performed by: HOSPITALIST

## 2022-05-04 PROCEDURE — 63710000001 INSULIN LISPRO (HUMAN) PER 5 UNITS: Performed by: INTERNAL MEDICINE

## 2022-05-04 PROCEDURE — 94761 N-INVAS EAR/PLS OXIMETRY MLT: CPT

## 2022-05-04 PROCEDURE — 94664 DEMO&/EVAL PT USE INHALER: CPT

## 2022-05-04 RX ADMIN — INSULIN LISPRO 2 UNITS: 100 INJECTION, SOLUTION INTRAVENOUS; SUBCUTANEOUS at 08:47

## 2022-05-04 RX ADMIN — INSULIN LISPRO 4 UNITS: 100 INJECTION, SOLUTION INTRAVENOUS; SUBCUTANEOUS at 17:47

## 2022-05-04 RX ADMIN — MEGESTROL ACETATE 400 MG: 40 SUSPENSION ORAL at 08:48

## 2022-05-04 RX ADMIN — MIDODRINE HYDROCHLORIDE 10 MG: 5 TABLET ORAL at 06:45

## 2022-05-04 RX ADMIN — MIDODRINE HYDROCHLORIDE 10 MG: 5 TABLET ORAL at 11:36

## 2022-05-04 RX ADMIN — IPRATROPIUM BROMIDE AND ALBUTEROL SULFATE 3 ML: .5; 3 SOLUTION RESPIRATORY (INHALATION) at 15:26

## 2022-05-04 RX ADMIN — ASPIRIN 81 MG: 81 TABLET, CHEWABLE ORAL at 08:48

## 2022-05-04 RX ADMIN — IPRATROPIUM BROMIDE AND ALBUTEROL SULFATE 3 ML: .5; 3 SOLUTION RESPIRATORY (INHALATION) at 11:16

## 2022-05-04 RX ADMIN — IPRATROPIUM BROMIDE AND ALBUTEROL SULFATE 3 ML: .5; 3 SOLUTION RESPIRATORY (INHALATION) at 07:02

## 2022-05-04 RX ADMIN — INSULIN LISPRO 2 UNITS: 100 INJECTION, SOLUTION INTRAVENOUS; SUBCUTANEOUS at 11:35

## 2022-05-04 RX ADMIN — SODIUM BICARBONATE 650 MG: 650 TABLET ORAL at 08:48

## 2022-05-04 RX ADMIN — MICONAZOLE NITRATE 1 APPLICATION: 20 CREAM TOPICAL at 08:49

## 2022-05-04 RX ADMIN — MIDODRINE HYDROCHLORIDE 10 MG: 5 TABLET ORAL at 17:47

## 2022-05-04 RX ADMIN — Medication 100 MG: at 08:48

## 2022-05-04 RX ADMIN — ATORVASTATIN CALCIUM 20 MG: 20 TABLET, FILM COATED ORAL at 08:48

## 2022-05-04 RX ADMIN — PANTOPRAZOLE SODIUM 40 MG: 40 TABLET, DELAYED RELEASE ORAL at 08:48

## 2022-05-04 RX ADMIN — Medication 10 ML: at 08:49

## 2022-05-04 RX ADMIN — Medication 1 APPLICATION: at 08:49

## 2022-05-04 NOTE — NURSING NOTE
When doing orthostatic blood pressures prior to patient discharge, patient got dizzy and complained of shortness of air. He was audibly gasping, shaking, and had to be seated again. He was placed on 3 L nasal cannula in order for his sats to come back up.    BP layin/85 on room air  BP standing #1: 129/102, placed on 2 L  BP standing #2: 82/60, placed on 3 L

## 2022-05-04 NOTE — PLAN OF CARE
Education complete for this admission.      Problem: Adult Inpatient Plan of Care  Goal: Plan of Care Review  Outcome: Met  Flowsheets  Taken 5/4/2022 1102 by Rona Seay RN  Progress: improving  Taken 5/3/2022 1752 by Maria M Aguilar RN  Plan of Care Reviewed With: patient  Goal: Patient-Specific Goal (Individualized)  Outcome: Met  Goal: Absence of Hospital-Acquired Illness or Injury  Outcome: Met  Intervention: Identify and Manage Fall Risk  Description: Perform standard risk assessment on admission using a validated tool or comprehensive approach appropriate to the patient; reassess fall risk frequently, with change in status or transfer to another level of care.  Communicate fall injury risk to interprofessional healthcare team.  Determine need for increased observation, equipment and environmental modification, such as low bed, signage and supportive, nonskid footwear.  Adjust safety measures to individual developmental age, stage and identified risk factors.  Reinforce the importance of safety and physical activity with patient and family.  Perform regular intentional rounding to assess need for position change, pain assessment and personal needs, including assistance with toileting.  Recent Flowsheet Documentation  Taken 5/4/2022 1000 by Rona Seay, RN  Safety Promotion/Fall Prevention:   safety round/check completed   activity supervised   assistive device/personal items within reach   clutter free environment maintained   lighting adjusted   nonskid shoes/slippers when out of bed   room organization consistent  Taken 5/4/2022 0800 by Rona Seay, RN  Safety Promotion/Fall Prevention:   safety round/check completed   activity supervised   assistive device/personal items within reach   clutter free environment maintained   lighting adjusted   nonskid shoes/slippers when out of bed   room organization consistent  Intervention: Prevent Skin Injury  Description: Perform a screening for skin  injury risk, such as pressure or moisture associated skin damage on admission and at regular intervals throughout hospital stay.  Keep all areas of skin (especially folds) clean and dry.  Maintain adequate skin hydration.  Relieve and redistribute pressure and protect bony prominences; implement measures based on patient-specific risk factors.  Match turning and repositioning schedule to clinical condition.  Encourage weight shift frequently; assist with reposition if unable to complete independently.  Float heels off bed; avoid pressure on the Achilles tendon.  Keep skin free from extended contact with medical devices.  Encourage functional activity and mobility, as early as tolerated.  Use aids (e.g., slide boards, mechanical lift) during transfer.  Recent Flowsheet Documentation  Taken 5/4/2022 1000 by Rona Seay RN  Body Position: position changed independently  Taken 5/4/2022 0800 by Rona Seay RN  Body Position: position changed independently  Skin Protection:   adhesive use limited   incontinence pads utilized   tubing/devices free from skin contact  Intervention: Prevent and Manage VTE (Venous Thromboembolism) Risk  Description: Assess for VTE (venous thromboembolism) risk.  Encourage and assist with early ambulation.  Initiate and maintain compression or other therapy, as indicated, based on identified risk in accordance with organizational protocol and provider order.  Encourage both active and passive leg exercises while in bed, if unable to ambulate.  Recent Flowsheet Documentation  Taken 5/4/2022 1000 by Rona Seay RN  Activity Management: activity adjusted per tolerance  Taken 5/4/2022 0800 by Rona Seay RN  Activity Management: activity adjusted per tolerance  VTE Prevention/Management:   bilateral   compression stockings on  Range of Motion: active ROM (range of motion) encouraged  Intervention: Prevent Infection  Description: Maintain skin and mucous membrane integrity;  promote hand, oral and pulmonary hygiene.  Optimize fluid balance, nutrition, sleep and glycemic control to maximize infection resistance.  Identify potential sources of infection early to prevent or mitigate progression of infection (e.g., wound, lines, devices).  Evaluate ongoing need for invasive devices; remove promptly when no longer indicated.  Recent Flowsheet Documentation  Taken 5/4/2022 1000 by Rona Seay RN  Infection Prevention:   hand hygiene promoted   personal protective equipment utilized   rest/sleep promoted   single patient room provided   visitors restricted/screened  Taken 5/4/2022 0800 by Rona Seay RN  Infection Prevention:   hand hygiene promoted   personal protective equipment utilized   rest/sleep promoted   single patient room provided   visitors restricted/screened  Goal: Optimal Comfort and Wellbeing  Outcome: Met  Intervention: Provide Person-Centered Care  Description: Use a family-focused approach to care.  Develop trust and rapport by proactively providing information, encouraging questions, addressing concerns and offering reassurance.  Acknowledge emotional response to hospitalization.  Recognize and utilize personal coping strategies.  Honor spiritual and cultural preferences.  Recent Flowsheet Documentation  Taken 5/4/2022 0800 by Rona Seay RN  Trust Relationship/Rapport:   care explained   choices provided   emotional support provided   empathic listening provided   questions answered   questions encouraged   reassurance provided   thoughts/feelings acknowledged  Goal: Readiness for Transition of Care  Outcome: Met

## 2022-05-04 NOTE — CASE MANAGEMENT/SOCIAL WORK
"Physicians Statement of Medical Necessity for  Ambulance Transportation    GENERAL INFORMATION     Name: Thad Light  YOB: 1940  Medicare #: [id number]  Transport Date: 5/4/2022  (Valid for round trips this date, or for scheduled repetitive trips for 60 days from the date signed below.)  Origin: [facility]    Destination: [facility]   Is the Patient's stay covered under Medicare Part A (PPS/DRG?)Yes  Closest appropriate facility? Yes  If this a hosp-hosp transfer? No  Is this a hospice patient? No    MEDICAL NECESSITY QUESTIONAIRE    Ambulance Transportation is medically necessary only if other means of transportation are contraindicated or would be potentially harmful to the patient.  To meet this requirement, the patient must be either \"bed confined\" or suffer from a condition such that transport by means other than an ambulance is contraindicated by the patient's condition.  The following questions must be answered by the healthcare professional signing below for this form to be valid:     1) Describe the MEDICAL CONDITION (physical and/or mental) of this patient AT THE TIME OF AMBULANCE TRANSPORT that requires the patient to be transported in an ambulance, and why transport by other means is contraindicated by the patient's condition:   Past Medical History:   Diagnosis Date   • Anemia    • Arthritis    • BPH (benign prostatic hyperplasia)    • COVID-19    • Diabetes mellitus (HCC)    • GERD (gastroesophageal reflux disease)    • Hyperlipidemia    • Pulmonary hypertension (HCC)    • Respiratory failure (HCC)       History reviewed. No pertinent surgical history.   2) Is this patient \"bed confined\" as defined below?Yes   To be \"bed confined\" the patient must satisfy all three of the following criteria:  (1) unable to get up from bed without assistance; AND (2) unable to ambulate;  AND (3) unable to sit in a chair or wheelchair.  3) Can this patient safely be transported by car or wheelchair " van (I.e., may safely sit during transport, without an attendant or monitoring?)No   4. In addition to completing questions 1-3 above, please check any of the following conditions that apply*:          *Note: supporting documentation for any boxes checked must be maintained in the patient's medical records Medical attendant required, Requires oxygen - unable to self administer and Unable to tolerate seated position for time needed to transport      SIGNATURE OF PHYSICIAN OR OTHER AUTHORIZED HEALTHCARE PROFESSIONAL    I certify that the above information is true and correct based on my evaluation of this patient, and represent that the patient requires transport by ambulance and that other forms of transport are contraindicated.  I understand that this information will be used by the Centers for Medicare and Medicaid Services (CMS) to support the determiniation of medical necessity for ambulance services, and I represent that I have personal knowledge of the patient's condition at the time of transport.       If this box is checked, I also certify that the patient is physically or mentally incapable of signing the ambulance service's claim form and that the institution with which I am affiliated has furnished care, services or assistance to the patient.  My signature below is made on behalf of the patient pursuant to 42 .36(b)(4). In accordance with 42 .37, the specific reason(s) that the patient is physically or mentally incapable of signing the claim for is as follows:     Signature of Physician or Healthcare Professional  Date/Time:        (For Scheduled repetitive transport, this form is not valid for transports performed more than 60 days after this date).                                                                                                                                            --------------------------------------------------------------------------------------------  Printed Name  and Credentials of Physician or Authorized Healthcare Professional     *Form must be signed by patient's attending physician for scheduled, repetitive transports,.  For non-repetitive ambulance transports, if unable to obtain the signature of the attending physician, any of the following may sign (please select below):     Physician  Clinical Nurse Specialist  Registered Nurse     Physician Assistant  Discharge Planner  Licensed Practical Nurse     Nurse Practitioner

## 2022-05-04 NOTE — PROGRESS NOTES
Name: Thad Light ADMIT: 2022   : 1940  PCP: Qi Ann PA    MRN: [id number] LOS: 0 days   AGE/SEX: 81 y.o. male  ROOM: [facility]     Subjective   Subjective   No new issues.  Got up for breakfast and had no dizziness.  Daughter brought in compression stockings evidently purchased at [employer].    Review of Systems     Objective   Objective   Vital Signs  Temp:  [98 °F (36.7 °C)-98.6 °F (37 °C)] 98 °F (36.7 °C)  Heart Rate:  [] 91  Resp:  [18] 18  BP: ()/(66-90) 123/71  SpO2:  [94 %-98 %] 95 %  on   ;   Device (Oxygen Therapy): room air  Body mass index is 22.2 kg/m².  Physical Exam  Constitutional:       General: He is not in acute distress.     Appearance: He is not diaphoretic.   Cardiovascular:      Rate and Rhythm: Normal rate and regular rhythm.      Heart sounds: Normal heart sounds.   Pulmonary:      Effort: Pulmonary effort is normal.      Breath sounds: Normal breath sounds.   Abdominal:      General: Bowel sounds are normal.      Palpations: Abdomen is soft.      Tenderness: There is no abdominal tenderness.   Musculoskeletal:         General: No swelling.   Skin:     General: Skin is warm and dry.      Findings: Bruising present.   Neurological:      Mental Status: He is alert and oriented to person, place, and time.         Results Review     I reviewed the patient's new clinical results.  Results from last 7 days   Lab Units 22  0739 22  0747 22  0622  0517   WBC 10*3/mm3 7.69 8.29 8.71 8.25   HEMOGLOBIN g/dL 10.4* 10.0* 9.5* 10.4*   PLATELETS 10*3/mm3 147 131* 141 146     Results from last 7 days   Lab Units 22  0819 22  0747 22  0601 22  0517   SODIUM mmol/L 136 139 140 141   POTASSIUM mmol/L 4.3 4.6 4.3 4.4   CHLORIDE mmol/L 107 108* 109* 111*   CO2 mmol/L 19.7* 19.0* 17.5* 18.7*   BUN mg/dL 20 19 21 17   CREATININE mg/dL 0.97 0.93 1.11 0.90   GLUCOSE mg/dL 182* 157* 176* 169*   EGFR mL/min/1.73 77.5 81.5 65.9 84.7      Results from last 7 days   Lab Units 05/02/22  0819 04/28/22  1255   ALBUMIN g/dL 3.20* 3.60   BILIRUBIN mg/dL 0.2 0.3   ALK PHOS U/L 48 39   AST (SGOT) U/L 13 17   ALT (SGPT) U/L 24 22     Results from last 7 days   Lab Units 05/02/22  0819 05/01/22  0747 04/30/22  0601 04/29/22  0517 04/28/22  1255   CALCIUM mg/dL 8.4* 8.5* 8.3* 8.5* 8.8   ALBUMIN g/dL 3.20*  --   --   --  3.60   MAGNESIUM mg/dL  --   --   --  2.8* 1.4*   PHOSPHORUS mg/dL  --   --   --  3.6  --         Results from last 7 days   Lab Units 04/29/22  0517 04/28/22  2132 04/28/22  1255   TROPONIN T ng/mL 0.020 0.026 0.033*   PROBNP pg/mL  --   --  1,468.0     Glucose   Date/Time Value Ref Range Status   05/04/2022 0625 175 (H) 70 - 130 mg/dL Final     Comment:     Meter: [id number] : [id number] Kasey Rochedy CHARLEEN   05/03/2022 2228 190 (H) 70 - 130 mg/dL Final     Comment:     Meter: [id number] : [id number] Kasey Mario CHARLEEN   05/03/2022 1630 209 (H) 70 - 130 mg/dL Final     Comment:     RN Notified R and V Meter: [id number] : [id number] Chicho VILLASEÑOR   05/03/2022 1052 259 (H) 70 - 130 mg/dL Final     Comment:     RN Notified R and V Meter: [id number] : [id number] Chicho VILLASEÑOR   05/03/2022 0629 198 (H) 70 - 130 mg/dL Final     Comment:     Meter: [id number] : [id number] Silas VILLASEÑOR   05/02/2022 2127 243 (H) 70 - 130 mg/dL Final     Comment:     Meter: [id number] : [id number] Ronald Villagomez RN   05/02/2022 1127 163 (H) 70 - 130 mg/dL Final     Comment:     Meter: [id number] : [id number] Page Romy VILLASEÑOR       No radiology results for the last day  Scheduled Medications  aspirin, 81 mg, Oral, Daily  atorvastatin, 20 mg, Oral, Daily  insulin lispro, 0-9 Units, Subcutaneous, TID AC  ipratropium-albuterol, 3 mL, Nebulization, 4x Daily - RT  megestrol, 400 mg, Oral, BID  miconazole, 1 application, Topical, BID  midodrine, 10 mg, Oral, TID AC  neomycin-bacitracin-polymyxin b, 1 application, Topical, Daily  pantoprazole, 40 mg,  Oral, Daily  senna-docusate sodium, 2 tablet, Oral, BID  sodium bicarbonate, 650 mg, Oral, BID  sodium chloride, 10 mL, Intravenous, Q12H  tamsulosin, 0.4 mg, Oral, Nightly  thiamine, 100 mg, Oral, Daily    Infusions   Diet  Diet Regular; Consistent Carbohydrate       Assessment/Plan     Active Hospital Problems    Diagnosis  POA   • **Orthostatic hypotension [I95.1]  Yes   • BPH (benign prostatic hyperplasia) [N40.0]  Yes   • Pulmonary hypertension  [I27.20]  Yes   • Type 2 diabetes mellitus with hyperglycemia, without long-term current use of insulin (HCC) [E11.65]  Yes   • History of COVID-19 jan 2022 [Z86.16]  Yes   • PVC (premature ventricular contraction) [I49.3]  Yes   • Elevated troponin [R77.8]  Yes      Resolved Hospital Problems    Diagnosis Date Resolved POA   • Hypomagnesemia [E83.42] 05/02/2022 Yes       81 y.o. male admitted with Orthostatic hypotension.    Continue compression stockings both lower extremities.  Knee-high were ordered though current stockings barely reach mid shin.  Nonetheless per nursing staff orthostatic hypotension has resolved.  Patient feels much better.  Pre-CERT has been obtained and plan is to discharge today to skilled nursing facility.      · SCDs for DVT prophylaxis.  · Full code.  · Discussed with patient and care team on multidisciplinary rounds.  · Anticipate discharge to SNU facility today.      Kip Kc MD  [facility]  05/04/22  08:21 EDT

## 2022-05-04 NOTE — PLAN OF CARE
Problem: Adult Inpatient Plan of Care  Goal: Absence of Hospital-Acquired Illness or Injury  Intervention: Prevent Skin Injury  Description: Perform a screening for skin injury risk, such as pressure or moisture associated skin damage on admission and at regular intervals throughout hospital stay.  Keep all areas of skin (especially folds) clean and dry.  Maintain adequate skin hydration.  Relieve and redistribute pressure and protect bony prominences; implement measures based on patient-specific risk factors.  Match turning and repositioning schedule to clinical condition.  Encourage weight shift frequently; assist with reposition if unable to complete independently.  Float heels off bed; avoid pressure on the Achilles tendon.  Keep skin free from extended contact with medical devices.  Encourage functional activity and mobility, as early as tolerated.  Use aids (e.g., slide boards, mechanical lift) during transfer.  Recent Flowsheet Documentation  Taken 5/4/2022 0418 by Patel Pickering RN  Body Position: position changed independently  Taken 5/4/2022 0200 by Patel Pickering RN  Body Position: position changed independently  Taken 5/4/2022 0002 by Patel Pickering RN  Body Position: position changed independently  Taken 5/3/2022 2217 by Patel Pickering RN  Body Position: position changed independently  Taken 5/3/2022 2059 by Patel Pickering RN  Body Position: position changed independently   Goal Outcome Evaluation:   Patient resting most of night. Patient has worn compression socks all during shift. Patient's vitals are stable, bed in lowest position, and call light in reach.

## 2022-05-05 NOTE — CASE MANAGEMENT/SOCIAL WORK
Case Management Discharge Note      Final Note: d/c to [facility] via EMS    Provided Post Acute Provider List?: N/A  Provided Post Acute Provider Quality & Resource List?: N/A    Selected Continued Care - Discharged on 5/4/2022 Admission date: 4/28/2022 - Discharge disposition: Skilled Nursing Facility (DC - External)    Destination Coordination complete.    Service Provider Selected Services Address Phone Fax Patient Preferred    [facility]  Skilled Nursing [address] [address] [phone] --          Durable Medical Equipment    No services have been selected for the patient.              Dialysis/Infusion    No services have been selected for the patient.              Home Medical Care     Service Provider Selected Services Address Phone Fax Patient Preferred    [facility] [address] [address] [phone] --          Therapy    No services have been selected for the patient.              Community Resources    No services have been selected for the patient.              Community & DME    No services have been selected for the patient.                  Transportation Services  Ambulance: [facility]    Final Discharge Disposition Code: 03 - skilled nursing facility (SNF)

## 2022-05-25 ENCOUNTER — OFFICE VISIT (OUTPATIENT)
Dept: CARDIOLOGY | Facility: CLINIC | Age: 82
End: 2022-05-25

## 2022-05-25 VITALS
WEIGHT: 181 LBS | BODY MASS INDEX: 23.23 KG/M2 | HEIGHT: 74 IN | SYSTOLIC BLOOD PRESSURE: 120 MMHG | HEART RATE: 74 BPM | DIASTOLIC BLOOD PRESSURE: 60 MMHG

## 2022-05-25 DIAGNOSIS — E11.65 TYPE 2 DIABETES MELLITUS WITH HYPERGLYCEMIA, WITHOUT LONG-TERM CURRENT USE OF INSULIN: ICD-10-CM

## 2022-05-25 DIAGNOSIS — I95.1 ORTHOSTATIC HYPOTENSION: Primary | ICD-10-CM

## 2022-05-25 DIAGNOSIS — I49.3 PVC (PREMATURE VENTRICULAR CONTRACTION): ICD-10-CM

## 2022-05-25 DIAGNOSIS — I27.20 PULMONARY HYPERTENSION: Chronic | ICD-10-CM

## 2022-05-25 DIAGNOSIS — Z86.16 HISTORY OF COVID-19: Chronic | ICD-10-CM

## 2022-05-25 DIAGNOSIS — I51.89 DIASTOLIC DYSFUNCTION: ICD-10-CM

## 2022-05-25 DIAGNOSIS — N40.0 BENIGN PROSTATIC HYPERPLASIA, UNSPECIFIED WHETHER LOWER URINARY TRACT SYMPTOMS PRESENT: Chronic | ICD-10-CM

## 2022-05-25 PROCEDURE — 93000 ELECTROCARDIOGRAM COMPLETE: CPT | Performed by: NURSE PRACTITIONER

## 2022-05-25 PROCEDURE — 99214 OFFICE O/P EST MOD 30 MIN: CPT | Performed by: NURSE PRACTITIONER

## 2022-05-25 RX ORDER — SACCHAROMYCES BOULARDII 250 MG
250 CAPSULE ORAL DAILY
Qty: 30 CAPSULE | Refills: 11 | Status: SHIPPED | OUTPATIENT
Start: 2022-05-25

## 2022-05-25 RX ORDER — MIDODRINE HYDROCHLORIDE 10 MG/1
10 TABLET ORAL
Qty: 90 TABLET | Refills: 0 | Status: SHIPPED | OUTPATIENT
Start: 2022-05-25

## 2022-05-25 NOTE — PROGRESS NOTES
[facility] CARDIOLOGY  [address]  [facility] [address]  [place] [address]  Phone: [phone]      Patient Name: Thad Light  :1940  Age: 81 y.o.  Primary Cardiologist: Savannah Winston MD  Encounter Provider:  ANDREI Ramos      Chief Complaint     Orthostatic hypotension; hospital follow-up      SUBJECTIVE     History of Present Illness:  Thad Light is a 81 y.o.  male whose medical history includes BPH, mild dementia, type 2 diabetes, GERD, pulmonary hypertension, hyperlipidemia and oxygen dependent since COVID-19 pneumonia in early . They are followed in our office by Dr. Winston who saw him in consultation when he was admitted 2022 for palpitations and near syncope.  He was confirmed to have orthostatic hypotension; his lisinopril was stopped and he was discharged on 10 mg midodrine 3 times daily.  He also had increased PVCs but these improved after his low magnesium level was replaced.  He did have mildly elevated troponin which normalized; ischemia evaluation was recommended as an outpatient.    22 Follow-up:  He is here for hospital follow-up and I am seeing him for the first time today.  He is now at home; he was discharged from rehab about 2 weeks ago.  He is here with his daughter and wife; reports he is happy to be back home.  He has been taking the midodrine 10 mg 3 times a day; blood pressure at home has been 110s to 120s/60s to 70s.  He denies any lightheadedness or near syncope.  Physical therapy is seeing him at home; he does have some dyspnea when working with them but this resolves quickly with rest.  He take some steps at home on his own with a walker.  He denies any chest pain, palpitations, leg swelling, or orthopnea.  He has no longer required to use oxygen.  His appetite is good and he has gained about 10 pounds since hospital discharge; he denies nausea or vomiting but has some diarrhea.  He states this has  been going on for couple months; he has 1 loose stool per day.    Below is a summary of pertinent cardiology findings:  • April 30, 2022 echocardiogram showed left ventricular EF in disagreement with a calculated left ventricular EF; EF appears to be about 51 to 55%.  Also noted was grade 1 diastolic dysfunction, mild pulmonary hypertension with RVSP 35 to 45 mmHg, and borderline concentric left ventricular hypertrophy.  No valvular disease was noted.        Past Medical History:   Diagnosis Date   • Anemia    • Arthritis    • BPH (benign prostatic hyperplasia)    • COVID-19    • Diabetes mellitus (HCC)    • GERD (gastroesophageal reflux disease)    • Hyperlipidemia    • Pulmonary hypertension (HCC)    • Respiratory failure (HCC)          History reviewed. No pertinent surgical history.      Social History     Socioeconomic History   • Marital status:    Tobacco Use   • Smoking status: Never Smoker   • Smokeless tobacco: Never Used   Vaping Use   • Vaping Use: Never used   Substance and Sexual Activity   • Alcohol use: Not Currently   • Drug use: Never         Review of Systems     Review of Systems   Cardiovascular: Positive for dyspnea on exertion. Negative for chest pain, irregular heartbeat, leg swelling, near-syncope, orthopnea, palpitations and syncope.   Respiratory: Negative.    Musculoskeletal: Positive for muscle weakness.         Medications     Allergies as of 05/25/2022   • (No Known Allergies)         Current Outpatient Medications:   •  Dimethicone 5 % cream, Apply 1 application topically to the appropriate area as directed 2 (Two) Times a Day., Disp: , Rfl:   •  Insulin Lispro (humaLOG) 100 UNIT/ML injection, Inject  under the skin into the appropriate area as directed 4 (Four) Times a Day. Per sliding scale, Disp: , Rfl:   •  meclizine (ANTIVERT) 25 MG tablet, Take 25 mg by mouth 4 (Four) Times a Day As Needed., Disp: , Rfl:   •  megestrol acetate (MEGACE) 400 MG/10ML suspension oral  "suspension, Take 400 mg by mouth 2 (Two) Times a Day., Disp: , Rfl:   •  metFORMIN (GLUCOPHAGE) 500 MG tablet, Take 1,000 mg by mouth Daily., Disp: , Rfl:   •  miconazole (MICOTIN) 2 % cream, Apply 1 application topically to the appropriate area as directed 2 (Two) Times a Day., Disp: , Rfl:   •  midodrine (PROAMATINE) 10 MG tablet, Take 1 tablet by mouth 3 (Three) Times a Day Before Meals., Disp: 90 tablet, Rfl: 0  •  simvastatin (ZOCOR) 40 MG tablet, Take 40 mg by mouth Daily., Disp: , Rfl:   •  tamsulosin (FLOMAX) 0.4 MG capsule 24 hr capsule, Take 1 capsule by mouth Daily., Disp: , Rfl:   •  thiamine (VITAMIN B-1) 100 MG tablet  tablet, Take 200 mg by mouth 2 (Two) Times a Day., Disp: , Rfl:   •  acetaminophen (TYLENOL) 325 MG tablet, Take 650 mg by mouth Every 6 (Six) Hours As Needed for Mild Pain ., Disp: , Rfl:   •  albuterol (PROVENTIL) 2.5 MG/0.5ML nebulizer solution, Take 2.5 mg by nebulization Every 6 (Six) Hours As Needed for Wheezing., Disp: , Rfl:   •  aspirin 81 MG chewable tablet, Chew 81 mg Daily., Disp: , Rfl:         OBJECTIVE     Vital Signs:   /60 (BP Location: Left arm)   Pulse 74   Ht 188 cm (74.02\")   Wt 82.1 kg (181 lb)   BMI 23.23 kg/m²       Weight:  Wt Readings from Last 3 Encounters:   05/25/22 82.1 kg (181 lb)   05/03/22 78.5 kg (173 lb)     Body mass index is 23.23 kg/m².        Physical Exam     Physical Exam  Constitutional:       General: He is not in acute distress.  HENT:      Head: Normocephalic and atraumatic.      Mouth/Throat:      Mouth: Mucous membranes are moist.   Eyes:      General: No scleral icterus.     Extraocular Movements: Extraocular movements intact.      Conjunctiva/sclera: Conjunctivae normal.      Pupils: Pupils are equal, round, and reactive to light.   Cardiovascular:      Rate and Rhythm: Normal rate. Rhythm regularly irregular.      Pulses: Normal pulses.      Heart sounds: S1 normal and S2 normal.   Pulmonary:      Effort: No respiratory " distress.      Breath sounds: Normal breath sounds. No wheezing, rhonchi or rales.   Abdominal:      General: Bowel sounds are normal. There is no distension.      Palpations: Abdomen is soft.      Tenderness: There is no abdominal tenderness.   Musculoskeletal:         General: Normal range of motion.      Cervical back: Normal range of motion and neck supple.      Right lower le+ Pitting Edema present.      Left lower le+ Pitting Edema present.      Comments: Trace RLE and 1+ BLE   Skin:     General: Skin is warm and dry.      Coloration: Skin is not jaundiced.   Neurological:      Mental Status: He is alert and oriented to person, place, and time.   Psychiatric:         Mood and Affect: Mood normal.         Reviewed Data     Result Review :   The following data was reviewed by: ANDREI Ramos on 2022:  ABG on May 12, 2022 showed pH 7.450, PCO2 25.6, bicarb 17.4, and O2 saturation 97.6.  Labs on May 2, 2022 showed creatinine 1.0, potassium 4.3, bicarb 20, calcium 8.4, albumin 3.2, hemoglobin 10.4, and platelet count 147.  Labs on 2022 showed creatinine 1.0, potassium 4.2, bicarb 21, TSH 2.220, magnesium 1.4, hemoglobin 10.6, proBNP 1468, and troponin 0.033.        ECG 12 Lead    Date/Time: 2022 2:32 PM  Performed by: Stacy Brody APRN  Authorized by: Stacy Brody APRN   Comparison: compared with previous ECG from 2022  Similar to previous ECG  Rhythm: sinus rhythm  Ectopy: unifocal PVCs and atrial premature contractions  T flattening: all    Clinical impression: abnormal EKG            Assessment and Plan        Assessment and Plan      Assessment:  1. Orthostatic hypotension    2. Diastolic dysfunction    3. Pulmonary hypertension     4. PVC (premature ventricular contraction)    5. History of COVID-2022    6. Type 2 diabetes mellitus with hyperglycemia, without long-term current use of insulin (HCC)    7. Benign prostatic  hyperplasia, unspecified whether lower urinary tract symptoms present         1. Orthostatic hypotension: Blood pressure is better controlled off lisinopril and he remains on 10 mg midodrine 3 times daily.  He denies any dizziness, lightheadedness, or syncope.  He has had no falls since he has been home.  2. Diastolic dysfunction: Echocardiogram on April 30, 2022 showed EF 51 to 55%, grade 1 diastolic dysfunction, and mild pulmonary hypertension.  He is compensated by exam today.  3. Pulmonary hypertension; mild by echocardiogram on April 30, 2022.  4. PVCs; unifocal PVCs noted on EKG today with frequent PACs.  He is asymptomatic with these.  His heart rate is controlled.  5. History of COVID-19 in January 2022: He is out of rehab and now at home with physical therapy coming in to see him.  He is now off oxygen.  6. Type 2 diabetes: He is treated with metformin.  No recent hemoglobin A1c to review.  7. BPH: He is treated with tamsulosin.  He denies any issues with urination.  8. Diarrhea: He has had this for several months.  He 1 loose stool daily.  Denies fever or chills    Plan:  1. I will send in Florastor to see if this helps with diarrhea.  I recommend that he take this up with primary care; they have an upcoming appointment.  2. Asked that labs at upcoming primary care appointment be sent to our office to review.  3. Discussed with Dr. Winston about whether ischemic evaluation needed at this time given his frailty.  We will hold off as he is not having any chest pain and reevaluate when he is hopefully stronger.  4. He will follow-up with Dr. Winston in 3 months.          Follow Up:   Return in about 3 months (around 8/25/2022) for Follow-up with Dr. Winston.  Orders Placed This Encounter   Procedures   • ECG 12 Lead          I appreciate the opportunity to participate in this patient's care.      Thank you,  ANDREI Rivera

## 2022-06-16 ENCOUNTER — TELEPHONE (OUTPATIENT)
Dept: CARDIOLOGY | Facility: CLINIC | Age: 82
End: 2022-06-16

## 2022-06-16 NOTE — TELEPHONE ENCOUNTER
ANDREI Cabrera is out of the office today.  She recently saw patient requested blood work from PCP office.    Blood work collected 6/2/2022 showed the following:    · Hemoglobin 11.4 and hematocrit 35.2 low.  · Hemoglobin A1c 6.9.  · Total cholesterol 166, LDL 94, triglycerides 227, and HDL 27 low.  · Vitamin D level normal.  · CMP normal except for potassium of 3.4, chloride 112, CO2 20, glucose 156.  · TSH normal.  · Chest x-ray completed showed peripheral airspace opacities in both lungs has improved with residual scarring.    I will defer for Layla to review upon her return.

## 2022-08-02 ENCOUNTER — TELEPHONE (OUTPATIENT)
Dept: CARDIOLOGY | Facility: CLINIC | Age: 82
End: 2022-08-02